# Patient Record
Sex: FEMALE | Race: WHITE | NOT HISPANIC OR LATINO | Employment: UNEMPLOYED | URBAN - NONMETROPOLITAN AREA
[De-identification: names, ages, dates, MRNs, and addresses within clinical notes are randomized per-mention and may not be internally consistent; named-entity substitution may affect disease eponyms.]

---

## 2024-08-26 ENCOUNTER — HOSPITAL ENCOUNTER (EMERGENCY)
Facility: HOSPITAL | Age: 59
Discharge: HOME | End: 2024-08-26
Attending: EMERGENCY MEDICINE
Payer: MEDICAID

## 2024-08-26 ENCOUNTER — APPOINTMENT (OUTPATIENT)
Dept: RADIOLOGY | Facility: HOSPITAL | Age: 59
End: 2024-08-26
Payer: MEDICAID

## 2024-08-26 VITALS
WEIGHT: 167.77 LBS | HEIGHT: 63 IN | OXYGEN SATURATION: 97 % | DIASTOLIC BLOOD PRESSURE: 86 MMHG | BODY MASS INDEX: 29.73 KG/M2 | SYSTOLIC BLOOD PRESSURE: 148 MMHG | HEART RATE: 105 BPM | TEMPERATURE: 98.3 F | RESPIRATION RATE: 17 BRPM

## 2024-08-26 DIAGNOSIS — S89.92XA INJURY OF LEFT KNEE, INITIAL ENCOUNTER: Primary | ICD-10-CM

## 2024-08-26 DIAGNOSIS — M25.562 ACUTE PAIN OF LEFT KNEE: ICD-10-CM

## 2024-08-26 DIAGNOSIS — M17.12 OSTEOARTHRITIS OF LEFT KNEE, UNSPECIFIED OSTEOARTHRITIS TYPE: ICD-10-CM

## 2024-08-26 DIAGNOSIS — M79.89 LEFT LEG SWELLING: ICD-10-CM

## 2024-08-26 PROCEDURE — 99284 EMERGENCY DEPT VISIT MOD MDM: CPT | Mod: 25

## 2024-08-26 PROCEDURE — 73564 X-RAY EXAM KNEE 4 OR MORE: CPT | Mod: LEFT SIDE | Performed by: RADIOLOGY

## 2024-08-26 PROCEDURE — 93971 EXTREMITY STUDY: CPT | Performed by: RADIOLOGY

## 2024-08-26 PROCEDURE — 73564 X-RAY EXAM KNEE 4 OR MORE: CPT | Mod: LT

## 2024-08-26 PROCEDURE — 93971 EXTREMITY STUDY: CPT

## 2024-08-26 RX ORDER — ESCITALOPRAM OXALATE 20 MG/1
20 TABLET ORAL DAILY
COMMUNITY

## 2024-08-26 RX ORDER — ESCITALOPRAM OXALATE 5 MG/5ML
5 SOLUTION ORAL
COMMUNITY

## 2024-08-26 RX ORDER — GABAPENTIN 300 MG/1
300 CAPSULE ORAL 2 TIMES DAILY
COMMUNITY

## 2024-08-26 RX ORDER — FLUTICASONE PROPIONATE 50 MCG
1 SPRAY, SUSPENSION (ML) NASAL
COMMUNITY
Start: 2017-08-31

## 2024-08-26 RX ORDER — FAMOTIDINE 40 MG/1
40 TABLET, FILM COATED ORAL
COMMUNITY
Start: 2023-02-24

## 2024-08-26 ASSESSMENT — PAIN SCALES - GENERAL
PAINLEVEL_OUTOF10: 4
PAINLEVEL_OUTOF10: 10 - WORST POSSIBLE PAIN

## 2024-08-26 ASSESSMENT — COLUMBIA-SUICIDE SEVERITY RATING SCALE - C-SSRS
6. HAVE YOU EVER DONE ANYTHING, STARTED TO DO ANYTHING, OR PREPARED TO DO ANYTHING TO END YOUR LIFE?: NO
2. HAVE YOU ACTUALLY HAD ANY THOUGHTS OF KILLING YOURSELF?: NO
1. IN THE PAST MONTH, HAVE YOU WISHED YOU WERE DEAD OR WISHED YOU COULD GO TO SLEEP AND NOT WAKE UP?: NO

## 2024-08-26 ASSESSMENT — PAIN - FUNCTIONAL ASSESSMENT
PAIN_FUNCTIONAL_ASSESSMENT: 0-10
PAIN_FUNCTIONAL_ASSESSMENT: 0-10

## 2024-08-26 ASSESSMENT — PAIN DESCRIPTION - ORIENTATION: ORIENTATION: LEFT

## 2024-08-26 ASSESSMENT — PAIN DESCRIPTION - PAIN TYPE: TYPE: ACUTE PAIN

## 2024-08-26 ASSESSMENT — PAIN DESCRIPTION - DESCRIPTORS: DESCRIPTORS: ACHING

## 2024-08-26 ASSESSMENT — PAIN DESCRIPTION - LOCATION: LOCATION: KNEE
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
## 2024-08-26 NOTE — ED TRIAGE NOTES
Pt arrived via EMS with left knee pain. Pt reports the pain results from getting out of a squatting position about 3 days ago. Pt reports the pain is at about an 8/10 at the time or triage.

## 2025-04-06 ENCOUNTER — APPOINTMENT (OUTPATIENT)
Dept: RADIOLOGY | Facility: HOSPITAL | Age: 60
End: 2025-04-06
Payer: MEDICAID

## 2025-04-06 ENCOUNTER — HOSPITAL ENCOUNTER (EMERGENCY)
Facility: HOSPITAL | Age: 60
Discharge: HOME | End: 2025-04-06
Attending: EMERGENCY MEDICINE
Payer: MEDICAID

## 2025-04-06 VITALS
DIASTOLIC BLOOD PRESSURE: 71 MMHG | HEART RATE: 78 BPM | SYSTOLIC BLOOD PRESSURE: 126 MMHG | RESPIRATION RATE: 20 BRPM | OXYGEN SATURATION: 94 % | HEIGHT: 63 IN | WEIGHT: 175 LBS | BODY MASS INDEX: 31.01 KG/M2 | TEMPERATURE: 97.5 F

## 2025-04-06 DIAGNOSIS — S91.301A NON-HEALING WOUND OF RIGHT HEEL: ICD-10-CM

## 2025-04-06 DIAGNOSIS — S91.302A NON HEALING LEFT HEEL WOUND: Primary | ICD-10-CM

## 2025-04-06 LAB
BASOPHILS # BLD AUTO: 0.07 X10*3/UL (ref 0–0.1)
BASOPHILS NFR BLD AUTO: 1 %
EOSINOPHIL # BLD AUTO: 0.26 X10*3/UL (ref 0–0.7)
EOSINOPHIL NFR BLD AUTO: 3.9 %
ERYTHROCYTE [DISTWIDTH] IN BLOOD BY AUTOMATED COUNT: 13.7 % (ref 11.5–14.5)
ERYTHROCYTE [SEDIMENTATION RATE] IN BLOOD BY WESTERGREN METHOD: 5 MM/H (ref 0–30)
HCT VFR BLD AUTO: 41.8 % (ref 36–46)
HGB BLD-MCNC: 13.1 G/DL (ref 12–16)
HOLD SPECIMEN: NORMAL
IMM GRANULOCYTES # BLD AUTO: 0.01 X10*3/UL (ref 0–0.7)
IMM GRANULOCYTES NFR BLD AUTO: 0.1 % (ref 0–0.9)
LYMPHOCYTES # BLD AUTO: 2.11 X10*3/UL (ref 1.2–4.8)
LYMPHOCYTES NFR BLD AUTO: 31.6 %
MCH RBC QN AUTO: 33.9 PG (ref 26–34)
MCHC RBC AUTO-ENTMCNC: 31.3 G/DL (ref 32–36)
MCV RBC AUTO: 108 FL (ref 80–100)
MONOCYTES # BLD AUTO: 0.6 X10*3/UL (ref 0.1–1)
MONOCYTES NFR BLD AUTO: 9 %
NEUTROPHILS # BLD AUTO: 3.63 X10*3/UL (ref 1.2–7.7)
NEUTROPHILS NFR BLD AUTO: 54.4 %
NRBC BLD-RTO: 0 /100 WBCS (ref 0–0)
PLATELET # BLD AUTO: 203 X10*3/UL (ref 150–450)
RBC # BLD AUTO: 3.87 X10*6/UL (ref 4–5.2)
WBC # BLD AUTO: 6.7 X10*3/UL (ref 4.4–11.3)

## 2025-04-06 PROCEDURE — 73700 CT LOWER EXTREMITY W/O DYE: CPT | Mod: RT

## 2025-04-06 PROCEDURE — 85652 RBC SED RATE AUTOMATED: CPT | Performed by: EMERGENCY MEDICINE

## 2025-04-06 PROCEDURE — 36415 COLL VENOUS BLD VENIPUNCTURE: CPT | Performed by: EMERGENCY MEDICINE

## 2025-04-06 PROCEDURE — 85025 COMPLETE CBC W/AUTO DIFF WBC: CPT | Performed by: EMERGENCY MEDICINE

## 2025-04-06 PROCEDURE — 73700 CT LOWER EXTREMITY W/O DYE: CPT | Mod: RIGHT SIDE | Performed by: STUDENT IN AN ORGANIZED HEALTH CARE EDUCATION/TRAINING PROGRAM

## 2025-04-06 PROCEDURE — 2500000001 HC RX 250 WO HCPCS SELF ADMINISTERED DRUGS (ALT 637 FOR MEDICARE OP): Mod: SE | Performed by: EMERGENCY MEDICINE

## 2025-04-06 PROCEDURE — 99284 EMERGENCY DEPT VISIT MOD MDM: CPT | Mod: 25 | Performed by: EMERGENCY MEDICINE

## 2025-04-06 RX ORDER — TRAMADOL HYDROCHLORIDE 50 MG/1
50 TABLET ORAL EVERY 6 HOURS PRN
Status: DISCONTINUED | OUTPATIENT
Start: 2025-04-06 | End: 2025-04-06

## 2025-04-06 RX ORDER — IBUPROFEN 600 MG/1
600 TABLET ORAL ONCE
Status: COMPLETED | OUTPATIENT
Start: 2025-04-06 | End: 2025-04-06

## 2025-04-06 RX ADMIN — TRAMADOL HYDROCHLORIDE 50 MG: 50 TABLET, COATED ORAL at 16:41

## 2025-04-06 RX ADMIN — IBUPROFEN 600 MG: 600 TABLET ORAL at 16:54

## 2025-04-06 ASSESSMENT — PAIN DESCRIPTION - LOCATION
LOCATION: FOOT
LOCATION: FOOT

## 2025-04-06 ASSESSMENT — COLUMBIA-SUICIDE SEVERITY RATING SCALE - C-SSRS
2. HAVE YOU ACTUALLY HAD ANY THOUGHTS OF KILLING YOURSELF?: NO
6. HAVE YOU EVER DONE ANYTHING, STARTED TO DO ANYTHING, OR PREPARED TO DO ANYTHING TO END YOUR LIFE?: NO
1. IN THE PAST MONTH, HAVE YOU WISHED YOU WERE DEAD OR WISHED YOU COULD GO TO SLEEP AND NOT WAKE UP?: NO

## 2025-04-06 ASSESSMENT — PAIN DESCRIPTION - ORIENTATION
ORIENTATION: RIGHT
ORIENTATION: RIGHT

## 2025-04-06 ASSESSMENT — PAIN DESCRIPTION - PAIN TYPE
TYPE: ACUTE PAIN
TYPE: ACUTE PAIN

## 2025-04-06 ASSESSMENT — PAIN DESCRIPTION - PROGRESSION
CLINICAL_PROGRESSION: GRADUALLY WORSENING
CLINICAL_PROGRESSION: GRADUALLY IMPROVING
CLINICAL_PROGRESSION: GRADUALLY IMPROVING

## 2025-04-06 ASSESSMENT — PAIN SCALES - GENERAL
PAINLEVEL_OUTOF10: 7
PAINLEVEL_OUTOF10: 10 - WORST POSSIBLE PAIN
PAINLEVEL_OUTOF10: 10 - WORST POSSIBLE PAIN
PAINLEVEL_OUTOF10: 3

## 2025-04-06 ASSESSMENT — PAIN DESCRIPTION - DESCRIPTORS
DESCRIPTORS: SHARP
DESCRIPTORS: STABBING;SHARP

## 2025-04-06 ASSESSMENT — PAIN - FUNCTIONAL ASSESSMENT
PAIN_FUNCTIONAL_ASSESSMENT: 0-10

## 2025-04-06 NOTE — DISCHARGE INSTRUCTIONS
Change dressing daily after carefully cleaning the area and patting it dry.  Apply new silver dressing and wrap.    Follow-up with your wound care team for further help in healing this ulceration.      Return for worsening symptoms or concerns.

## 2025-04-06 NOTE — ED PROVIDER NOTES
[facility]   ED  Provider Note  4/6/2025  4:05 PM  AC11/AC11      Chief Complaint   Patient presents with    Wound Check     Open wound to right foot        History of Present Illness:   Gloria Bhagat is a 59 y.o. female presenting to the ED for right heel pain and wound, beginning several weeks ago.  The complaint has been persistent, moderate in severity, and worsened by weightbearing.  Patient has a wound on her right heel is been present for several weeks.  Is actually improving with silver dressing treatments.  She ran out of her silver sheets today and is in the ER to get more supplies.  The pain is unchanged from her baseline.  She did hurt severely stress when she walks on it.  She is followed in pain management to control this pain.  She denies fever chills lymphangitis or signs systemic illness.      Review of Systems:   Pertinent positives and review of systems as noted above.  Remaining 10 review of systems is negative or noncontributory to today's episode of care.  Review of Systems       --------------------------------------------- PAST HISTORY ---------------------------------------------  Past Medical History:   Past Medical History:   Diagnosis Date    Emphysema lung (Multi)         Past Surgical History:   Past Surgical History:   Procedure Laterality Date    COLONOSCOPY  10/26/2017    Complete Colonoscopy        Social History:   Social History     Social History Narrative    Not on file        Family History: family history is not on file. Unless otherwise noted, family history is non contributory    Patient's Medications   New Prescriptions    No medications on file   Previous Medications    ESCITALOPRAM (LEXAPRO) 20 MG TABLET    Take 1 tablet (20 mg) by mouth once daily.    ESCITALOPRAM (LEXAPRO) 5 MG/5 ML SOLUTION    Take 5 mL (5 mg) by mouth.    FAMOTIDINE (PEPCID) 40 MG TABLET    Take 1 tablet (40 mg) by mouth.    FLUTICASONE (FLONASE) 50 MCG/ACTUATION NASAL SPRAY    1 spray.     "GABAPENTIN (NEURONTIN) 300 MG CAPSULE    Take 1 capsule (300 mg) by mouth 2 times a day.   Modified Medications    No medications on file   Discontinued Medications    No medications on file      The patient’s home medications have been reviewed.    Allergies: Penicillin    -------------------------------------------------- RESULTS -------------------------------------------------  All laboratory and radiology results have been personally reviewed by myself   LABS:  Labs Reviewed   CBC WITH AUTO DIFFERENTIAL - Abnormal       Result Value    WBC 6.7      nRBC 0.0      RBC 3.87 (*)     Hemoglobin 13.1      Hematocrit 41.8       (*)     MCH 33.9      MCHC 31.3 (*)     RDW 13.7      Platelets 203      Neutrophils % 54.4      Immature Granulocytes %, Automated 0.1      Lymphocytes % 31.6      Monocytes % 9.0      Eosinophils % 3.9      Basophils % 1.0      Neutrophils Absolute 3.63      Immature Granulocytes Absolute, Automated 0.01      Lymphocytes Absolute 2.11      Monocytes Absolute 0.60      Eosinophils Absolute 0.26      Basophils Absolute 0.07     SEDIMENTATION RATE, AUTOMATED - Normal    Sedimentation Rate 5     GRAY TOP         RADIOLOGY:  Interpreted by Radiologist.  CT foot right wo IV contrast   Final Result   Diffuse subcutaneous soft tissue edema may reflect cellulitis, venous   stasis, lymphedema, or fluid overload.        No osseous erosive change to suggest osteomyelitis. If there is   persistent clinical concern, consider MRI.        MACRO:   None        Signed by: Cornel Batista 4/6/2025 5:46 PM   Dictation workstation:   [id number]          No results found for this or any previous visit (from the past 4464 hours).  ------------------------- NURSING NOTES AND VITALS REVIEWED ---------------------------   The nursing notes within the ED encounter and vital signs as below have been reviewed.   /78   Pulse 87   Temp 36.5 °C (97.7 °F) (Temporal)   Resp 18   Ht 1.6 m (5' 3\")   Wt 79.4 kg " (175 lb)   SpO2 93%   BMI 31.00 kg/m²   Oxygen Saturation Interpretation: Normal      ---------------------------------------------------PHYSICAL EXAM--------------------------------------  Physical Exam   Constitutional/General: Alert,  well appearing, non toxic in NAD  Head: Normocephalic and atraumatic  Eyes: PERRL, EOMI, conjunctiva normal, sclera non icteric  Mouth: Oropharynx clear, handling secretions, no trismus, no asymmetry of the posterior oropharynx or uvular edema  Neck: Supple, full ROM, non tender to palpation in the midline, no stridor, no crepitus, no meningeal signs  Respiratory: Lungs clear to auscultation bilaterally, no wheezes, rales, or rhonchi. Not in respiratory distress  Cardiovascular:  Regular rate. Regular rhythm. No murmurs, gallops, or rubs. 2+ distal pulses  Chest: No chest wall tenderness  GI:  Abdomen Soft, Non tender, Non distended.  +BS. No organomegaly, no palpable masses,  No rebound, guarding, or rigidity.   Musculoskeletal: Moves all extremities x 4. Warm and well perfused, no clubbing, cyanosis, or edema. Capillary refill <3 seconds  Integument: skin warm and dry. No rashes.   Lymphatic: no lymphadenopathy noted  Neurologic: No focal deficits, symmetric strength 5/5 in the upper and lower extremities bilaterally  Psychiatric: Normal Affect    Procedures    ------------------------------ ED COURSE/MEDICAL DECISION MAKING----------------------  Diagnoses as of 04/09/25 0837   Non healing left heel wound - cancel this diagnosis   Non-healing wound of right heel      Patient has a wound on her left heel with no evidence of localized infection on clinical exam.  She has some moderate swelling.  She did use Lidoderm patches for several weeks to control the pain which caused an allergic reaction.  CT scan is negative for evidence of osteomyelitis.  The patient's wound was dressed and she discharged home for outpatient management.      Medical Decision Making:   Discharge to  home  Diagnoses as of 04/09/25 0837   Non healing left heel wound - cancel this diagnosis   Non-healing wound of right heel      Counseling:   The emergency provider has spoken with the patient and discussed today’s results, in addition to providing specific details for the plan of care and counseling regarding the diagnosis and prognosis.  Questions are answered at this time and they are agreeable with the plan.      --------------------------------- IMPRESSION AND DISPOSITION ---------------------------------        IMPRESSION  1. Non healing left heel wound        DISPOSITION  Disposition: Discharge to home  Patient condition is fair      Billing Provider Critical Care Time: 0 minutes     Saad Hernandez MD  04/06/25 1756       Saad Hernandez MD  04/09/25 0847

## 2025-04-18 ENCOUNTER — APPOINTMENT (OUTPATIENT)
Dept: RADIOLOGY | Facility: HOSPITAL | Age: 60
End: 2025-04-18
Payer: MEDICAID

## 2025-04-18 ENCOUNTER — HOSPITAL ENCOUNTER (EMERGENCY)
Facility: HOSPITAL | Age: 60
Discharge: ANOTHER HOSPITAL | End: 2025-04-19
Attending: EMERGENCY MEDICINE
Payer: MEDICAID

## 2025-04-18 ENCOUNTER — APPOINTMENT (OUTPATIENT)
Dept: CARDIOLOGY | Facility: HOSPITAL | Age: 60
End: 2025-04-18
Payer: MEDICAID

## 2025-04-18 DIAGNOSIS — R91.1 NODULE OF UPPER LOBE OF LEFT LUNG: ICD-10-CM

## 2025-04-18 DIAGNOSIS — J44.1 COPD EXACERBATION (MULTI): ICD-10-CM

## 2025-04-18 DIAGNOSIS — L97.519: Primary | ICD-10-CM

## 2025-04-18 DIAGNOSIS — J18.9 PNEUMONIA OF BOTH LOWER LOBES DUE TO INFECTIOUS ORGANISM: ICD-10-CM

## 2025-04-18 DIAGNOSIS — J43.9 PULMONARY EMPHYSEMA, UNSPECIFIED EMPHYSEMA TYPE (MULTI): ICD-10-CM

## 2025-04-18 LAB
ALBUMIN SERPL BCP-MCNC: 4.1 G/DL (ref 3.4–5)
ALP SERPL-CCNC: 51 U/L (ref 33–110)
ALT SERPL W P-5'-P-CCNC: 12 U/L (ref 7–45)
ANION GAP BLDA CALCULATED.4IONS-SCNC: 5 MMO/L (ref 10–25)
ANION GAP SERPL CALC-SCNC: 11 MMOL/L (ref 10–20)
ARTERIAL PATENCY WRIST A: POSITIVE
AST SERPL W P-5'-P-CCNC: 16 U/L (ref 9–39)
BASE EXCESS BLDA CALC-SCNC: 3.5 MMOL/L (ref -2–3)
BASOPHILS # BLD AUTO: 0.05 X10*3/UL (ref 0–0.1)
BASOPHILS NFR BLD AUTO: 0.6 %
BILIRUB SERPL-MCNC: 0.4 MG/DL (ref 0–1.2)
BNP SERPL-MCNC: 20 PG/ML (ref 0–99)
BODY TEMPERATURE: ABNORMAL
BUN SERPL-MCNC: 19 MG/DL (ref 6–23)
CA-I BLDA-SCNC: 1.2 MMOL/L (ref 1.1–1.33)
CALCIUM SERPL-MCNC: 9.1 MG/DL (ref 8.6–10.3)
CARDIAC TROPONIN I PNL SERPL HS: 5 NG/L (ref 0–13)
CARDIAC TROPONIN I PNL SERPL HS: 5 NG/L (ref 0–13)
CHLORIDE BLDA-SCNC: 108 MMOL/L (ref 98–107)
CHLORIDE SERPL-SCNC: 104 MMOL/L (ref 98–107)
CO2 SERPL-SCNC: 28 MMOL/L (ref 21–32)
CREAT SERPL-MCNC: 0.9 MG/DL (ref 0.5–1.05)
D DIMER PPP FEU-MCNC: 863 NG/ML FEU
EGFRCR SERPLBLD CKD-EPI 2021: 74 ML/MIN/1.73M*2
EOSINOPHIL # BLD AUTO: 0.26 X10*3/UL (ref 0–0.7)
EOSINOPHIL NFR BLD AUTO: 3.1 %
ERYTHROCYTE [DISTWIDTH] IN BLOOD BY AUTOMATED COUNT: 13.3 % (ref 11.5–14.5)
GLUCOSE BLDA-MCNC: 120 MG/DL (ref 74–99)
GLUCOSE SERPL-MCNC: 113 MG/DL (ref 74–99)
HCO3 BLDA-SCNC: 27.8 MMOL/L (ref 22–26)
HCT VFR BLD AUTO: 38.6 % (ref 36–46)
HCT VFR BLD EST: 39 % (ref 36–46)
HGB BLD-MCNC: 12.8 G/DL (ref 12–16)
HGB BLDA-MCNC: 13.1 G/DL (ref 12–16)
HOLD SPECIMEN: NORMAL
HOLD SPECIMEN: NORMAL
IMM GRANULOCYTES # BLD AUTO: 0.02 X10*3/UL (ref 0–0.7)
IMM GRANULOCYTES NFR BLD AUTO: 0.2 % (ref 0–0.9)
INHALED O2 CONCENTRATION: 21 %
LACTATE BLDA-SCNC: 0.5 MMOL/L (ref 0.4–2)
LYMPHOCYTES # BLD AUTO: 1.76 X10*3/UL (ref 1.2–4.8)
LYMPHOCYTES NFR BLD AUTO: 21.2 %
MCH RBC QN AUTO: 33.1 PG (ref 26–34)
MCHC RBC AUTO-ENTMCNC: 33.2 G/DL (ref 32–36)
MCV RBC AUTO: 100 FL (ref 80–100)
MONOCYTES # BLD AUTO: 0.81 X10*3/UL (ref 0.1–1)
MONOCYTES NFR BLD AUTO: 9.7 %
NEUTROPHILS # BLD AUTO: 5.41 X10*3/UL (ref 1.2–7.7)
NEUTROPHILS NFR BLD AUTO: 65.2 %
NRBC BLD-RTO: 0 /100 WBCS (ref 0–0)
OXYHGB MFR BLDA: 80.9 % (ref 94–98)
PCO2 BLDA: 40 MM HG (ref 38–42)
PH BLDA: 7.45 PH (ref 7.38–7.42)
PLATELET # BLD AUTO: 235 X10*3/UL (ref 150–450)
PO2 BLDA: 53 MM HG (ref 85–95)
POTASSIUM BLDA-SCNC: 3.9 MMOL/L (ref 3.5–5.3)
POTASSIUM SERPL-SCNC: 3.6 MMOL/L (ref 3.5–5.3)
PROT SERPL-MCNC: 6.6 G/DL (ref 6.4–8.2)
RBC # BLD AUTO: 3.87 X10*6/UL (ref 4–5.2)
SAO2 % BLDA: 90 % (ref 94–100)
SODIUM BLDA-SCNC: 137 MMOL/L (ref 136–145)
SODIUM SERPL-SCNC: 139 MMOL/L (ref 136–145)
SPECIMEN DRAWN FROM PATIENT: ABNORMAL
WBC # BLD AUTO: 8.3 X10*3/UL (ref 4.4–11.3)

## 2025-04-18 PROCEDURE — 84132 ASSAY OF SERUM POTASSIUM: CPT | Performed by: EMERGENCY MEDICINE

## 2025-04-18 PROCEDURE — 71275 CT ANGIOGRAPHY CHEST: CPT

## 2025-04-18 PROCEDURE — 36415 COLL VENOUS BLD VENIPUNCTURE: CPT | Performed by: EMERGENCY MEDICINE

## 2025-04-18 PROCEDURE — 96365 THER/PROPH/DIAG IV INF INIT: CPT

## 2025-04-18 PROCEDURE — 2500000002 HC RX 250 W HCPCS SELF ADMINISTERED DRUGS (ALT 637 FOR MEDICARE OP, ALT 636 FOR OP/ED): Mod: JZ,SE | Performed by: EMERGENCY MEDICINE

## 2025-04-18 PROCEDURE — 93005 ELECTROCARDIOGRAM TRACING: CPT

## 2025-04-18 PROCEDURE — 96366 THER/PROPH/DIAG IV INF ADDON: CPT

## 2025-04-18 PROCEDURE — 94640 AIRWAY INHALATION TREATMENT: CPT

## 2025-04-18 PROCEDURE — 96367 TX/PROPH/DG ADDL SEQ IV INF: CPT

## 2025-04-18 PROCEDURE — 85025 COMPLETE CBC W/AUTO DIFF WBC: CPT | Performed by: EMERGENCY MEDICINE

## 2025-04-18 PROCEDURE — 81003 URINALYSIS AUTO W/O SCOPE: CPT | Performed by: EMERGENCY MEDICINE

## 2025-04-18 PROCEDURE — 2500000004 HC RX 250 GENERAL PHARMACY W/ HCPCS (ALT 636 FOR OP/ED): Mod: JZ,SE | Performed by: EMERGENCY MEDICINE

## 2025-04-18 PROCEDURE — 73700 CT LOWER EXTREMITY W/O DYE: CPT | Mod: RIGHT SIDE | Performed by: STUDENT IN AN ORGANIZED HEALTH CARE EDUCATION/TRAINING PROGRAM

## 2025-04-18 PROCEDURE — 85379 FIBRIN DEGRADATION QUANT: CPT | Performed by: EMERGENCY MEDICINE

## 2025-04-18 PROCEDURE — 87075 CULTR BACTERIA EXCEPT BLOOD: CPT | Mod: CONLAB | Performed by: EMERGENCY MEDICINE

## 2025-04-18 PROCEDURE — 71045 X-RAY EXAM CHEST 1 VIEW: CPT | Performed by: STUDENT IN AN ORGANIZED HEALTH CARE EDUCATION/TRAINING PROGRAM

## 2025-04-18 PROCEDURE — 99285 EMERGENCY DEPT VISIT HI MDM: CPT | Mod: 25 | Performed by: EMERGENCY MEDICINE

## 2025-04-18 PROCEDURE — 73700 CT LOWER EXTREMITY W/O DYE: CPT | Mod: RT

## 2025-04-18 PROCEDURE — 87040 BLOOD CULTURE FOR BACTERIA: CPT | Mod: CONLAB | Performed by: EMERGENCY MEDICINE

## 2025-04-18 PROCEDURE — 2550000001 HC RX 255 CONTRASTS: Mod: JZ,SE | Performed by: EMERGENCY MEDICINE

## 2025-04-18 PROCEDURE — 84484 ASSAY OF TROPONIN QUANT: CPT | Performed by: EMERGENCY MEDICINE

## 2025-04-18 PROCEDURE — 2500000005 HC RX 250 GENERAL PHARMACY W/O HCPCS: Mod: SE | Performed by: EMERGENCY MEDICINE

## 2025-04-18 PROCEDURE — 71275 CT ANGIOGRAPHY CHEST: CPT | Performed by: RADIOLOGY

## 2025-04-18 PROCEDURE — 71045 X-RAY EXAM CHEST 1 VIEW: CPT

## 2025-04-18 PROCEDURE — 83880 ASSAY OF NATRIURETIC PEPTIDE: CPT | Performed by: EMERGENCY MEDICINE

## 2025-04-18 RX ORDER — CEFTRIAXONE 1 G/50ML
1 INJECTION, SOLUTION INTRAVENOUS ONCE
Status: COMPLETED | OUTPATIENT
Start: 2025-04-18 | End: 2025-04-19

## 2025-04-18 RX ORDER — AZITHROMYCIN MONOHYDRATE 500 MG/5ML
INJECTION, POWDER, LYOPHILIZED, FOR SOLUTION INTRAVENOUS
Status: COMPLETED
Start: 2025-04-18 | End: 2025-04-18

## 2025-04-18 RX ORDER — IPRATROPIUM BROMIDE AND ALBUTEROL SULFATE 2.5; .5 MG/3ML; MG/3ML
3 SOLUTION RESPIRATORY (INHALATION)
Status: DISCONTINUED | OUTPATIENT
Start: 2025-04-18 | End: 2025-04-19

## 2025-04-18 RX ADMIN — Medication 2 L/MIN: at 19:59

## 2025-04-18 RX ADMIN — IPRATROPIUM BROMIDE AND ALBUTEROL SULFATE 3 ML: .5; 3 SOLUTION RESPIRATORY (INHALATION) at 19:35

## 2025-04-18 RX ADMIN — IOHEXOL 50 ML: 350 INJECTION, SOLUTION INTRAVENOUS at 21:38

## 2025-04-18 RX ADMIN — VANCOMYCIN HYDROCHLORIDE 1500 MG: 1.5 INJECTION, POWDER, LYOPHILIZED, FOR SOLUTION INTRAVENOUS at 19:41

## 2025-04-18 RX ADMIN — AZITHROMYCIN 500 MG: 500 INJECTION, POWDER, LYOPHILIZED, FOR SOLUTION INTRAVENOUS at 22:59

## 2025-04-18 RX ADMIN — CEFTRIAXONE 1 G: 1 INJECTION, SOLUTION INTRAVENOUS at 22:59

## 2025-04-18 RX ADMIN — Medication 2 L/MIN: at 19:54

## 2025-04-18 ASSESSMENT — COLUMBIA-SUICIDE SEVERITY RATING SCALE - C-SSRS
6. HAVE YOU EVER DONE ANYTHING, STARTED TO DO ANYTHING, OR PREPARED TO DO ANYTHING TO END YOUR LIFE?: NO
1. IN THE PAST MONTH, HAVE YOU WISHED YOU WERE DEAD OR WISHED YOU COULD GO TO SLEEP AND NOT WAKE UP?: NO
2. HAVE YOU ACTUALLY HAD ANY THOUGHTS OF KILLING YOURSELF?: NO

## 2025-04-18 ASSESSMENT — PAIN DESCRIPTION - PROGRESSION: CLINICAL_PROGRESSION: GRADUALLY WORSENING

## 2025-04-18 ASSESSMENT — PAIN - FUNCTIONAL ASSESSMENT: PAIN_FUNCTIONAL_ASSESSMENT: 0-10

## 2025-04-18 ASSESSMENT — PAIN DESCRIPTION - FREQUENCY: FREQUENCY: CONSTANT/CONTINUOUS

## 2025-04-18 ASSESSMENT — PAIN DESCRIPTION - LOCATION: LOCATION: FOOT

## 2025-04-18 ASSESSMENT — PAIN DESCRIPTION - PAIN TYPE: TYPE: ACUTE PAIN

## 2025-04-18 ASSESSMENT — PAIN DESCRIPTION - DESCRIPTORS: DESCRIPTORS: PRESSURE

## 2025-04-18 ASSESSMENT — PAIN DESCRIPTION - ORIENTATION: ORIENTATION: RIGHT

## 2025-04-18 ASSESSMENT — PAIN SCALES - GENERAL: PAINLEVEL_OUTOF10: 9

## 2025-04-18 NOTE — ED PROVIDER NOTES
[facility]   ED  Provider Note  4/18/2025  6:20 PM  AC02/AC02      Chief Complaint   Patient presents with    Foot Pain        History of Present Illness:   Gloria Bhagat is a 59 y.o. female presenting to the ED for right foot ulcer, beginning more than a month ago.  The complaint has been persistent, moderate in severity, and worsened by nothing.  Patient was seen about a month ago in the ER for the same problem.  She has a follow-up with a podiatrist in a week or 2.  She has run out of antibiotics and feels that the wound is getting worse.  She has detected foul odor is increasing pain and tenderness of the calcaneus.  She denies signs systemic fever or chills.  She has bilateral lower extremity edema which is chronic for her.  She denies shortness of breath fever chills nausea vomiting or diarrhea.      Review of Systems:   Pertinent positives and review of systems as noted above.  Remaining 10 review of systems is negative or noncontributory to today's episode of care.  Review of Systems       --------------------------------------------- PAST HISTORY ---------------------------------------------  Past Medical History: Medical History[1]     Past Surgical History: Surgical History[2]     Social History:   Social History     Social History Narrative    Not on file        Family History: family history is not on file. Unless otherwise noted, family history is non contributory    Patient's Medications   New Prescriptions    No medications on file   Previous Medications    ESCITALOPRAM (LEXAPRO) 20 MG TABLET    Take 1 tablet (20 mg) by mouth once daily.    ESCITALOPRAM (LEXAPRO) 5 MG/5 ML SOLUTION    Take 5 mL (5 mg) by mouth.    FAMOTIDINE (PEPCID) 40 MG TABLET    Take 1 tablet (40 mg) by mouth.    FLUTICASONE (FLONASE) 50 MCG/ACTUATION NASAL SPRAY    1 spray.    GABAPENTIN (NEURONTIN) 300 MG CAPSULE    Take 1 capsule (300 mg) by mouth 2 times a day.   Modified Medications    No medications on file    Discontinued Medications    No medications on file      The patient’s home medications have been reviewed.    Allergies: Penicillin    -------------------------------------------------- RESULTS -------------------------------------------------  All laboratory and radiology results have been personally reviewed by myself   LABS:  Labs Reviewed   CBC WITH AUTO DIFFERENTIAL - Abnormal       Result Value    WBC 8.3      nRBC 0.0      RBC 3.87 (*)     Hemoglobin 12.8      Hematocrit 38.6            MCH 33.1      MCHC 33.2      RDW 13.3      Platelets 235      Neutrophils % 65.2      Immature Granulocytes %, Automated 0.2      Lymphocytes % 21.2      Monocytes % 9.7      Eosinophils % 3.1      Basophils % 0.6      Neutrophils Absolute 5.41      Immature Granulocytes Absolute, Automated 0.02      Lymphocytes Absolute 1.76      Monocytes Absolute 0.81      Eosinophils Absolute 0.26      Basophils Absolute 0.05     TISSUE/WOUND CULTURE/SMEAR   BLOOD CULTURE   BLOOD CULTURE   COMPREHENSIVE METABOLIC PANEL   BLOOD GAS ARTERIAL FULL PANEL   B-TYPE NATRIURETIC PEPTIDE   TROPONIN SERIES- (INITIAL, 1 HR)    Narrative:     The following orders were created for panel order Troponin Series, (0, 1 HR).  Procedure                               Abnormality         Status                     ---------                               -----------         ------                     Troponin I, High Sensiti...[[id number]]                      In process                 Troponin, High Sensitivi...[[id number]]                                                   Please view results for these tests on the individual orders.   D-DIMER, VTE EXCLUSION   SERIAL TROPONIN-INITIAL   SERIAL TROPONIN, 1 HOUR   GRAY TOP         RADIOLOGY:  Interpreted by Radiologist.  XR chest 1 view   Final Result   New nodular opacity at the left lung apex measuring 3.3 cm x 1.6 cm.   It demonstrates a somewhat elongated morphology. Therefore   differential diagnosis  "includes malignancy, atelectasis and   pneumonia. Unless clinical picture is consistent with pneumonia,   given emphysema and increased risk factors for lung cancer, recommend   CT of the chest for further evaluation, the urgency of which may be   determined on a clinical basis.        MACRO:   None.        Signed by: Deejay Domingo 4/18/2025 7:34 PM   Dictation workstation:   [id number]      CT foot right wo IV contrast   Final Result   1. Plantar soft tissue ulceration at the heel pad associated with   probable 1 cm x 0.9 cm abscess and surrounding induration and edema.        2. No osseous destruction or erosive changes of the calcaneus to   suggest osteomyelitis. MRI is more sensitive for detecting early   changes of osteomyelitis and may be of further diagnostic value if   clinical suspicion remains high.        3. Diffuse edema of the soft tissues without evidence of gas, which   is similar in degree to 04/06/2025. Correlate for cellulitis versus   venous stasis edema or combination thereof.             MACRO:   None.        Signed by: Deejay Domingo 4/18/2025 7:38 PM   Dictation workstation:   [id number]          No results found for this or any previous visit (from the past 4464 hours).  ------------------------- NURSING NOTES AND VITALS REVIEWED ---------------------------   The nursing notes within the ED encounter and vital signs as below have been reviewed.   /82 (BP Location: Right arm, Patient Position: Sitting)   Pulse 90   Temp 36.8 °C (98.2 °F) (Temporal)   Resp 18   Ht 1.6 m (5' 3\")   Wt 86.8 kg (191 lb 6.4 oz)   SpO2 90%   BMI 33.90 kg/m²   Oxygen Saturation Interpretation: Normal      ---------------------------------------------------PHYSICAL EXAM--------------------------------------  Physical Exam   Constitutional/General: Alert,  well appearing, non toxic in NAD  Head: Normocephalic and atraumatic  Eyes: PERRL, EOMI, conjunctiva normal, sclera non icteric  Mouth: " Oropharynx clear, handling secretions, no trismus, no asymmetry of the posterior oropharynx or uvular edema  Neck: Supple, full ROM, non tender to palpation in the midline, no stridor, no crepitus, no meningeal signs  Respiratory: Lungs elaina coarse breath sounds bilaterally, no wheezes, rales, or rhonchi. Not in respiratory distress  Cardiovascular:  Regular rate. Regular rhythm. No murmurs, gallops, or rubs. 2+ distal pulses  Chest: No chest wall tenderness  GI:  Abdomen Soft, Non tender, Non distended.  +BS. No organomegaly, no palpable masses,  No rebound, guarding, or rigidity.   Musculoskeletal: Moves all extremities x 4. Warm and well perfused, no clubbing, cyanosis, or edema. Capillary refill <3 seconds  Integument: skin warm and dry. No rashes.  1.5 cm ulceration in the heel of the right foot.  Minimal surrounding erythema.  No purulent drainage.  Lymphatic: no lymphadenopathy noted  Neurologic: No focal deficits, symmetric strength 5/5 in the upper and lower extremities bilaterally  Psychiatric: Normal Affect    Procedures    ------------------------------ ED COURSE/MEDICAL DECISION MAKING----------------------  Diagnoses as of 04/19/25 0750   Right foot ulcer, with unspecified severity (Multi)   Pulmonary emphysema, unspecified emphysema type (Multi)   Nodule of upper lobe of left lung   COPD exacerbation (Multi)   Pneumonia of both lower lobes due to infectious organism      Patient is a smoker.  She has a chronic cough and her pulse ox is 89 to 91% on room air.  She has a wound on the bottom of the right foot which has been present for more than a month.  She is yet to receive podiatry care.  She was seen in the ER and placed on antibiotics about a month ago for the same problem.  Chest x-ray reveals a 1.6 x 3.3 cm nodularity possible mass versus infectious cause in the left lung apex.      Medical Decision Making:   Patient was signed to Dr. Ferrer pending results.  Diagnoses as of 04/19/25 0750   Right  foot ulcer, with unspecified severity (Multi)   Pulmonary emphysema, unspecified emphysema type (Multi)   Nodule of upper lobe of left lung   COPD exacerbation (Multi)   Pneumonia of both lower lobes due to infectious organism      Counseling:   The emergency provider has spoken with the patient and discussed today’s results, in addition to providing specific details for the plan of care and counseling regarding the diagnosis and prognosis.  Questions are answered at this time and they are agreeable with the plan.      --------------------------------- IMPRESSION AND DISPOSITION ---------------------------------        IMPRESSION  1. Right foot ulcer, with unspecified severity (Multi)    2. Pulmonary emphysema, unspecified emphysema type (Multi)    3. Nodule of upper lobe of left lung        DISPOSITION  Disposition:  Signed out to oncoming physician  Patient condition is fair      Billing Provider Critical Care Time: 0 minutes         [1]   Past Medical History:  Diagnosis Date    Emphysema lung (Multi)    [2]   Past Surgical History:  Procedure Laterality Date    COLONOSCOPY  10/26/2017    Complete Colonoscopy        Saad Hernandez MD  04/19/25 5597

## 2025-04-19 ENCOUNTER — HOSPITAL ENCOUNTER (INPATIENT)
Facility: HOSPITAL | Age: 60
End: 2025-04-19
Attending: STUDENT IN AN ORGANIZED HEALTH CARE EDUCATION/TRAINING PROGRAM | Admitting: STUDENT IN AN ORGANIZED HEALTH CARE EDUCATION/TRAINING PROGRAM
Payer: MEDICAID

## 2025-04-19 VITALS
HEART RATE: 87 BPM | WEIGHT: 191.4 LBS | RESPIRATION RATE: 18 BRPM | SYSTOLIC BLOOD PRESSURE: 112 MMHG | BODY MASS INDEX: 33.91 KG/M2 | TEMPERATURE: 98 F | OXYGEN SATURATION: 93 % | HEIGHT: 63 IN | DIASTOLIC BLOOD PRESSURE: 53 MMHG

## 2025-04-19 DIAGNOSIS — J44.1 COPD EXACERBATION (MULTI): ICD-10-CM

## 2025-04-19 DIAGNOSIS — L97.519: Primary | ICD-10-CM

## 2025-04-19 PROBLEM — J18.9 PNEUMONIA OF BOTH LOWER LOBES DUE TO INFECTIOUS ORGANISM: Status: ACTIVE | Noted: 2025-04-19

## 2025-04-19 LAB
APPEARANCE UR: CLEAR
BILIRUB UR STRIP.AUTO-MCNC: NEGATIVE MG/DL
COLOR UR: NORMAL
GLUCOSE UR STRIP.AUTO-MCNC: NEGATIVE MG/DL
KETONES UR STRIP.AUTO-MCNC: NEGATIVE MG/DL
LEUKOCYTE ESTERASE UR QL STRIP.AUTO: NEGATIVE
MRSA DNA SPEC QL NAA+PROBE: NOT DETECTED
NITRITE UR QL STRIP.AUTO: NEGATIVE
PH UR STRIP.AUTO: 6 [PH]
PROT UR STRIP.AUTO-MCNC: NEGATIVE MG/DL
RBC # UR STRIP.AUTO: NEGATIVE MG/DL
SP GR UR STRIP.AUTO: 1.01
UROBILINOGEN UR STRIP.AUTO-MCNC: <2 MG/DL

## 2025-04-19 PROCEDURE — 9420000001 HC RT PATIENT EDUCATION 5 MIN

## 2025-04-19 PROCEDURE — 2500000005 HC RX 250 GENERAL PHARMACY W/O HCPCS: Mod: SE | Performed by: EMERGENCY MEDICINE

## 2025-04-19 PROCEDURE — 94640 AIRWAY INHALATION TREATMENT: CPT

## 2025-04-19 PROCEDURE — 2500000004 HC RX 250 GENERAL PHARMACY W/ HCPCS (ALT 636 FOR OP/ED): Performed by: PHARMACIST

## 2025-04-19 PROCEDURE — 87899 AGENT NOS ASSAY W/OPTIC: CPT | Mod: TRILAB | Performed by: NURSE PRACTITIONER

## 2025-04-19 PROCEDURE — 2500000004 HC RX 250 GENERAL PHARMACY W/ HCPCS (ALT 636 FOR OP/ED): Mod: JW | Performed by: NURSE PRACTITIONER

## 2025-04-19 PROCEDURE — 94668 MNPJ CHEST WALL SBSQ: CPT

## 2025-04-19 PROCEDURE — 97161 PT EVAL LOW COMPLEX 20 MIN: CPT | Mod: GP

## 2025-04-19 PROCEDURE — 2500000002 HC RX 250 W HCPCS SELF ADMINISTERED DRUGS (ALT 637 FOR MEDICARE OP, ALT 636 FOR OP/ED): Performed by: INTERNAL MEDICINE

## 2025-04-19 PROCEDURE — 87641 MR-STAPH DNA AMP PROBE: CPT | Performed by: NURSE PRACTITIONER

## 2025-04-19 PROCEDURE — S4991 NICOTINE PATCH NONLEGEND: HCPCS | Performed by: NURSE PRACTITIONER

## 2025-04-19 PROCEDURE — 99223 1ST HOSP IP/OBS HIGH 75: CPT | Performed by: NURSE PRACTITIONER

## 2025-04-19 PROCEDURE — 2500000004 HC RX 250 GENERAL PHARMACY W/ HCPCS (ALT 636 FOR OP/ED): Performed by: STUDENT IN AN ORGANIZED HEALTH CARE EDUCATION/TRAINING PROGRAM

## 2025-04-19 PROCEDURE — 2500000001 HC RX 250 WO HCPCS SELF ADMINISTERED DRUGS (ALT 637 FOR MEDICARE OP): Performed by: NURSE PRACTITIONER

## 2025-04-19 PROCEDURE — 94664 DEMO&/EVAL PT USE INHALER: CPT

## 2025-04-19 PROCEDURE — 2500000002 HC RX 250 W HCPCS SELF ADMINISTERED DRUGS (ALT 637 FOR MEDICARE OP, ALT 636 FOR OP/ED): Performed by: NURSE PRACTITIONER

## 2025-04-19 PROCEDURE — 97165 OT EVAL LOW COMPLEX 30 MIN: CPT | Mod: GO

## 2025-04-19 PROCEDURE — 2500000005 HC RX 250 GENERAL PHARMACY W/O HCPCS: Performed by: NURSE PRACTITIONER

## 2025-04-19 PROCEDURE — 99221 1ST HOSP IP/OBS SF/LOW 40: CPT | Performed by: STUDENT IN AN ORGANIZED HEALTH CARE EDUCATION/TRAINING PROGRAM

## 2025-04-19 PROCEDURE — 1100000001 HC PRIVATE ROOM DAILY

## 2025-04-19 PROCEDURE — 87449 NOS EACH ORGANISM AG IA: CPT | Mod: TRILAB | Performed by: NURSE PRACTITIONER

## 2025-04-19 RX ORDER — MAGNESIUM 200 MG
TABLET ORAL DAILY
COMMUNITY

## 2025-04-19 RX ORDER — FAMOTIDINE 20 MG/1
40 TABLET, FILM COATED ORAL DAILY
Status: DISCONTINUED | OUTPATIENT
Start: 2025-04-19 | End: 2025-04-21 | Stop reason: HOSPADM

## 2025-04-19 RX ORDER — FLUTICASONE PROPIONATE 50 MCG
1 SPRAY, SUSPENSION (ML) NASAL DAILY
Status: DISCONTINUED | OUTPATIENT
Start: 2025-04-19 | End: 2025-04-19

## 2025-04-19 RX ORDER — ERGOCALCIFEROL 1.25 MG/1
1 CAPSULE ORAL
COMMUNITY
Start: 2017-08-18

## 2025-04-19 RX ORDER — LIDOCAINE 560 MG/1
1 PATCH PERCUTANEOUS; TOPICAL; TRANSDERMAL DAILY
Status: DISCONTINUED | OUTPATIENT
Start: 2025-04-19 | End: 2025-04-21 | Stop reason: HOSPADM

## 2025-04-19 RX ORDER — DULOXETIN HYDROCHLORIDE 20 MG/1
60 CAPSULE, DELAYED RELEASE ORAL NIGHTLY
COMMUNITY
Start: 2025-03-14

## 2025-04-19 RX ORDER — VANCOMYCIN 1 G/200ML
1000 INJECTION, SOLUTION INTRAVENOUS EVERY 12 HOURS
Status: DISCONTINUED | OUTPATIENT
Start: 2025-04-19 | End: 2025-04-21

## 2025-04-19 RX ORDER — GUAIFENESIN 1200 MG/1
TABLET, EXTENDED RELEASE ORAL
COMMUNITY
Start: 2025-04-18

## 2025-04-19 RX ORDER — ALBUTEROL SULFATE 0.83 MG/ML
2.5 SOLUTION RESPIRATORY (INHALATION) EVERY 4 HOURS PRN
Status: DISCONTINUED | OUTPATIENT
Start: 2025-04-19 | End: 2025-04-21 | Stop reason: HOSPADM

## 2025-04-19 RX ORDER — IBUPROFEN 800 MG/1
1 TABLET ORAL 3 TIMES DAILY PRN
COMMUNITY
Start: 2017-08-15 | End: 2025-04-21 | Stop reason: HOSPADM

## 2025-04-19 RX ORDER — ONDANSETRON 4 MG/1
4 TABLET, ORALLY DISINTEGRATING ORAL EVERY 8 HOURS PRN
Status: DISCONTINUED | OUTPATIENT
Start: 2025-04-19 | End: 2025-04-21 | Stop reason: HOSPADM

## 2025-04-19 RX ORDER — BUPRENORPHINE AND NALOXONE 8; 2 MG/1; MG/1
1 FILM, SOLUBLE BUCCAL; SUBLINGUAL EVERY 12 HOURS
Status: DISCONTINUED | OUTPATIENT
Start: 2025-04-19 | End: 2025-04-21

## 2025-04-19 RX ORDER — LORATADINE 10 MG/1
10 TABLET ORAL
COMMUNITY
Start: 2017-08-25

## 2025-04-19 RX ORDER — MEROPENEM AND SODIUM CHLORIDE 1 G/50ML
1 INJECTION, SOLUTION INTRAVENOUS EVERY 8 HOURS
Status: DISCONTINUED | OUTPATIENT
Start: 2025-04-19 | End: 2025-04-21 | Stop reason: HOSPADM

## 2025-04-19 RX ORDER — ONDANSETRON HYDROCHLORIDE 2 MG/ML
4 INJECTION, SOLUTION INTRAVENOUS EVERY 8 HOURS PRN
Status: DISCONTINUED | OUTPATIENT
Start: 2025-04-19 | End: 2025-04-21 | Stop reason: HOSPADM

## 2025-04-19 RX ORDER — ALBUTEROL SULFATE 90 UG/1
2 INHALANT RESPIRATORY (INHALATION) EVERY 4 HOURS PRN
COMMUNITY
Start: 2017-02-03

## 2025-04-19 RX ORDER — IPRATROPIUM BROMIDE AND ALBUTEROL SULFATE 2.5; .5 MG/3ML; MG/3ML
3 SOLUTION RESPIRATORY (INHALATION)
Status: DISCONTINUED | OUTPATIENT
Start: 2025-04-19 | End: 2025-04-19

## 2025-04-19 RX ORDER — IPRATROPIUM BROMIDE AND ALBUTEROL SULFATE 2.5; .5 MG/3ML; MG/3ML
3 SOLUTION RESPIRATORY (INHALATION)
Status: DISCONTINUED | OUTPATIENT
Start: 2025-04-19 | End: 2025-04-21 | Stop reason: HOSPADM

## 2025-04-19 RX ORDER — FLUTICASONE PROPIONATE 50 MCG
2 SPRAY, SUSPENSION (ML) NASAL DAILY
Status: DISCONTINUED | OUTPATIENT
Start: 2025-04-20 | End: 2025-04-21 | Stop reason: HOSPADM

## 2025-04-19 RX ORDER — VANCOMYCIN HYDROCHLORIDE 1 G/20ML
INJECTION, POWDER, LYOPHILIZED, FOR SOLUTION INTRAVENOUS DAILY PRN
Status: DISCONTINUED | OUTPATIENT
Start: 2025-04-19 | End: 2025-04-21 | Stop reason: HOSPADM

## 2025-04-19 RX ORDER — IPRATROPIUM BROMIDE AND ALBUTEROL SULFATE 2.5; .5 MG/3ML; MG/3ML
3 SOLUTION RESPIRATORY (INHALATION) EVERY 2 HOUR PRN
Status: DISCONTINUED | OUTPATIENT
Start: 2025-04-19 | End: 2025-04-19 | Stop reason: HOSPADM

## 2025-04-19 RX ORDER — BUPRENORPHINE AND NALOXONE 8; 2 MG/1; MG/1
0.5 FILM, SOLUBLE BUCCAL; SUBLINGUAL EVERY 12 HOURS
COMMUNITY
Start: 2024-06-18 | End: 2025-05-08

## 2025-04-19 RX ORDER — VIT C/E/ZN/COPPR/LUTEIN/ZEAXAN 250MG-90MG
2500 CAPSULE ORAL
COMMUNITY
Start: 2025-03-03

## 2025-04-19 RX ORDER — ENOXAPARIN SODIUM 100 MG/ML
40 INJECTION SUBCUTANEOUS EVERY 24 HOURS
Status: DISCONTINUED | OUTPATIENT
Start: 2025-04-19 | End: 2025-04-21 | Stop reason: HOSPADM

## 2025-04-19 RX ORDER — IBUPROFEN 200 MG
1 TABLET ORAL DAILY
Status: DISCONTINUED | OUTPATIENT
Start: 2025-04-19 | End: 2025-04-21 | Stop reason: HOSPADM

## 2025-04-19 RX ORDER — DULOXETIN HYDROCHLORIDE 60 MG/1
60 CAPSULE, DELAYED RELEASE ORAL NIGHTLY
Status: DISCONTINUED | OUTPATIENT
Start: 2025-04-19 | End: 2025-04-21 | Stop reason: HOSPADM

## 2025-04-19 RX ORDER — BUSPIRONE HYDROCHLORIDE 15 MG/1
15 TABLET ORAL NIGHTLY
COMMUNITY
Start: 2025-03-20

## 2025-04-19 RX ORDER — ACETAMINOPHEN 325 MG/1
650 TABLET ORAL EVERY 4 HOURS PRN
Status: DISCONTINUED | OUTPATIENT
Start: 2025-04-19 | End: 2025-04-21 | Stop reason: HOSPADM

## 2025-04-19 RX ORDER — GABAPENTIN 300 MG/1
300 CAPSULE ORAL 2 TIMES DAILY
Status: DISCONTINUED | OUTPATIENT
Start: 2025-04-19 | End: 2025-04-21 | Stop reason: HOSPADM

## 2025-04-19 RX ORDER — LIDOCAINE 50 MG/G
1 PATCH TOPICAL
COMMUNITY
Start: 2020-11-16

## 2025-04-19 RX ORDER — PREDNISONE 20 MG/1
40 TABLET ORAL DAILY
Status: DISCONTINUED | OUTPATIENT
Start: 2025-04-19 | End: 2025-04-21 | Stop reason: HOSPADM

## 2025-04-19 RX ORDER — CETIRIZINE HYDROCHLORIDE 10 MG/1
10 TABLET ORAL DAILY
Status: DISCONTINUED | OUTPATIENT
Start: 2025-04-19 | End: 2025-04-21 | Stop reason: HOSPADM

## 2025-04-19 RX ORDER — KETOROLAC TROMETHAMINE 30 MG/ML
15 INJECTION, SOLUTION INTRAMUSCULAR; INTRAVENOUS EVERY 8 HOURS PRN
Status: DISPENSED | OUTPATIENT
Start: 2025-04-19 | End: 2025-04-21

## 2025-04-19 RX ADMIN — Medication 2 L/MIN: at 00:22

## 2025-04-19 RX ADMIN — IPRATROPIUM BROMIDE AND ALBUTEROL SULFATE 3 ML: 2.5; .5 SOLUTION RESPIRATORY (INHALATION) at 20:16

## 2025-04-19 RX ADMIN — NICOTINE 1 PATCH: 21 PATCH, EXTENDED RELEASE TRANSDERMAL at 10:17

## 2025-04-19 RX ADMIN — VANCOMYCIN 1000 MG: 1 INJECTION, SOLUTION INTRAVENOUS at 11:22

## 2025-04-19 RX ADMIN — FLUTICASONE PROPIONATE 1 SPRAY: 50 SPRAY, METERED NASAL at 10:17

## 2025-04-19 RX ADMIN — ENOXAPARIN SODIUM 40 MG: 40 INJECTION SUBCUTANEOUS at 10:17

## 2025-04-19 RX ADMIN — CETIRIZINE HYDROCHLORIDE 10 MG: 10 TABLET, FILM COATED ORAL at 10:17

## 2025-04-19 RX ADMIN — BUPRENORPHINE AND NALOXONE 1 FILM: 8; 2 FILM BUCCAL; SUBLINGUAL at 22:06

## 2025-04-19 RX ADMIN — KETOROLAC TROMETHAMINE 15 MG: 30 INJECTION, SOLUTION INTRAMUSCULAR at 10:30

## 2025-04-19 RX ADMIN — Medication 21 PERCENT: at 20:17

## 2025-04-19 RX ADMIN — PREDNISONE 40 MG: 20 TABLET ORAL at 17:57

## 2025-04-19 RX ADMIN — Medication 28 PERCENT: at 11:26

## 2025-04-19 RX ADMIN — FAMOTIDINE 40 MG: 20 TABLET, FILM COATED ORAL at 10:17

## 2025-04-19 RX ADMIN — MEROPENEM AND SODIUM CHLORIDE 1 G: 1 INJECTION, SOLUTION INTRAVENOUS at 10:17

## 2025-04-19 RX ADMIN — DULOXETINE HYDROCHLORIDE 60 MG: 60 CAPSULE, DELAYED RELEASE ORAL at 22:06

## 2025-04-19 RX ADMIN — MEROPENEM AND SODIUM CHLORIDE 1 G: 1 INJECTION, SOLUTION INTRAVENOUS at 17:57

## 2025-04-19 RX ADMIN — BUPRENORPHINE AND NALOXONE 1 FILM: 8; 2 FILM BUCCAL; SUBLINGUAL at 10:17

## 2025-04-19 RX ADMIN — BUSPIRONE HYDROCHLORIDE 15 MG: 5 TABLET ORAL at 22:06

## 2025-04-19 RX ADMIN — Medication 2 L/MIN: at 00:25

## 2025-04-19 RX ADMIN — LIDOCAINE 1 PATCH: 4 PATCH TOPICAL at 10:18

## 2025-04-19 RX ADMIN — GABAPENTIN 300 MG: 300 CAPSULE ORAL at 22:06

## 2025-04-19 RX ADMIN — GABAPENTIN 300 MG: 300 CAPSULE ORAL at 10:17

## 2025-04-19 RX ADMIN — IPRATROPIUM BROMIDE AND ALBUTEROL SULFATE 3 ML: 2.5; .5 SOLUTION RESPIRATORY (INHALATION) at 12:43

## 2025-04-19 RX ADMIN — VANCOMYCIN 1000 MG: 1 INJECTION, SOLUTION INTRAVENOUS at 23:30

## 2025-04-19 SDOH — SOCIAL STABILITY: SOCIAL INSECURITY
WITHIN THE LAST YEAR, HAVE YOU BEEN RAPED OR FORCED TO HAVE ANY KIND OF SEXUAL ACTIVITY BY YOUR PARTNER OR EX-PARTNER?: NO

## 2025-04-19 SDOH — SOCIAL STABILITY: SOCIAL INSECURITY: HAVE YOU HAD THOUGHTS OF HARMING ANYONE ELSE?: NO

## 2025-04-19 SDOH — SOCIAL STABILITY: SOCIAL INSECURITY: WITHIN THE LAST YEAR, HAVE YOU BEEN AFRAID OF YOUR PARTNER OR EX-PARTNER?: NO

## 2025-04-19 SDOH — HEALTH STABILITY: MENTAL HEALTH: HOW OFTEN DO YOU HAVE A DRINK CONTAINING ALCOHOL?: NEVER

## 2025-04-19 SDOH — ECONOMIC STABILITY: HOUSING INSECURITY: IN THE LAST 12 MONTHS, WAS THERE A TIME WHEN YOU WERE NOT ABLE TO PAY THE MORTGAGE OR RENT ON TIME?: NO

## 2025-04-19 SDOH — ECONOMIC STABILITY: FOOD INSECURITY: WITHIN THE PAST 12 MONTHS, YOU WORRIED THAT YOUR FOOD WOULD RUN OUT BEFORE YOU GOT THE MONEY TO BUY MORE.: NEVER TRUE

## 2025-04-19 SDOH — SOCIAL STABILITY: SOCIAL INSECURITY: WERE YOU ABLE TO COMPLETE ALL THE BEHAVIORAL HEALTH SCREENINGS?: YES

## 2025-04-19 SDOH — SOCIAL STABILITY: SOCIAL INSECURITY
WITHIN THE LAST YEAR, HAVE YOU BEEN KICKED, HIT, SLAPPED, OR OTHERWISE PHYSICALLY HURT BY YOUR PARTNER OR EX-PARTNER?: NO

## 2025-04-19 SDOH — HEALTH STABILITY: MENTAL HEALTH
DO YOU FEEL STRESS - TENSE, RESTLESS, NERVOUS, OR ANXIOUS, OR UNABLE TO SLEEP AT NIGHT BECAUSE YOUR MIND IS TROUBLED ALL THE TIME - THESE DAYS?: NOT AT ALL

## 2025-04-19 SDOH — ECONOMIC STABILITY: FOOD INSECURITY: WITHIN THE PAST 12 MONTHS, THE FOOD YOU BOUGHT JUST DIDN'T LAST AND YOU DIDN'T HAVE MONEY TO GET MORE.: NEVER TRUE

## 2025-04-19 SDOH — HEALTH STABILITY: MENTAL HEALTH: HOW MANY DRINKS CONTAINING ALCOHOL DO YOU HAVE ON A TYPICAL DAY WHEN YOU ARE DRINKING?: PATIENT DOES NOT DRINK

## 2025-04-19 SDOH — SOCIAL STABILITY: SOCIAL INSECURITY: WITHIN THE LAST YEAR, HAVE YOU BEEN HUMILIATED OR EMOTIONALLY ABUSED IN OTHER WAYS BY YOUR PARTNER OR EX-PARTNER?: NO

## 2025-04-19 SDOH — SOCIAL STABILITY: SOCIAL INSECURITY: DO YOU FEEL UNSAFE GOING BACK TO THE PLACE WHERE YOU ARE LIVING?: NO

## 2025-04-19 SDOH — SOCIAL STABILITY: SOCIAL INSECURITY: DOES ANYONE TRY TO KEEP YOU FROM HAVING/CONTACTING OTHER FRIENDS OR DOING THINGS OUTSIDE YOUR HOME?: NO

## 2025-04-19 SDOH — ECONOMIC STABILITY: INCOME INSECURITY: IN THE PAST 12 MONTHS HAS THE ELECTRIC, GAS, OIL, OR WATER COMPANY THREATENED TO SHUT OFF SERVICES IN YOUR HOME?: NO

## 2025-04-19 SDOH — HEALTH STABILITY: MENTAL HEALTH: HOW OFTEN DO YOU HAVE SIX OR MORE DRINKS ON ONE OCCASION?: NEVER

## 2025-04-19 SDOH — ECONOMIC STABILITY: TRANSPORTATION INSECURITY: IN THE PAST 12 MONTHS, HAS LACK OF TRANSPORTATION KEPT YOU FROM MEDICAL APPOINTMENTS OR FROM GETTING MEDICATIONS?: NO

## 2025-04-19 SDOH — ECONOMIC STABILITY: HOUSING INSECURITY: AT ANY TIME IN THE PAST 12 MONTHS, WERE YOU HOMELESS OR LIVING IN A SHELTER (INCLUDING NOW)?: NO

## 2025-04-19 SDOH — SOCIAL STABILITY: SOCIAL INSECURITY: ARE YOU OR HAVE YOU BEEN THREATENED OR ABUSED PHYSICALLY, EMOTIONALLY, OR SEXUALLY BY ANYONE?: NO

## 2025-04-19 SDOH — SOCIAL STABILITY: SOCIAL INSECURITY: DO YOU FEEL ANYONE HAS EXPLOITED OR TAKEN ADVANTAGE OF YOU FINANCIALLY OR OF YOUR PERSONAL PROPERTY?: NO

## 2025-04-19 SDOH — ECONOMIC STABILITY: FOOD INSECURITY: HOW HARD IS IT FOR YOU TO PAY FOR THE VERY BASICS LIKE FOOD, HOUSING, MEDICAL CARE, AND HEATING?: NOT VERY HARD

## 2025-04-19 SDOH — ECONOMIC STABILITY: HOUSING INSECURITY: IN THE PAST 12 MONTHS, HOW MANY TIMES HAVE YOU MOVED WHERE YOU WERE LIVING?: 0

## 2025-04-19 SDOH — SOCIAL STABILITY: SOCIAL INSECURITY: HAVE YOU HAD ANY THOUGHTS OF HARMING ANYONE ELSE?: NO

## 2025-04-19 SDOH — SOCIAL STABILITY: SOCIAL INSECURITY: ABUSE: ADULT

## 2025-04-19 SDOH — SOCIAL STABILITY: SOCIAL INSECURITY: HAS ANYONE EVER THREATENED TO HURT YOUR FAMILY OR YOUR PETS?: NO

## 2025-04-19 SDOH — SOCIAL STABILITY: SOCIAL INSECURITY: ARE THERE ANY APPARENT SIGNS OF INJURIES/BEHAVIORS THAT COULD BE RELATED TO ABUSE/NEGLECT?: NO

## 2025-04-19 ASSESSMENT — LIFESTYLE VARIABLES
AUDIT-C TOTAL SCORE: 0
AUDIT-C TOTAL SCORE: 0
SUBSTANCE_ABUSE_PAST_12_MONTHS: NO
SKIP TO QUESTIONS 9-10: 1
HOW OFTEN DO YOU HAVE A DRINK CONTAINING ALCOHOL: NEVER
HOW MANY STANDARD DRINKS CONTAINING ALCOHOL DO YOU HAVE ON A TYPICAL DAY: PATIENT DOES NOT DRINK
HOW OFTEN DO YOU HAVE 6 OR MORE DRINKS ON ONE OCCASION: NEVER
PRESCIPTION_ABUSE_PAST_12_MONTHS: NO
AUDIT-C TOTAL SCORE: 0
SKIP TO QUESTIONS 9-10: 1

## 2025-04-19 ASSESSMENT — COGNITIVE AND FUNCTIONAL STATUS - GENERAL
CLIMB 3 TO 5 STEPS WITH RAILING: A LITTLE
DRESSING REGULAR LOWER BODY CLOTHING: A LITTLE
WALKING IN HOSPITAL ROOM: A LITTLE
MOVING TO AND FROM BED TO CHAIR: A LITTLE
TOILETING: A LITTLE
CLIMB 3 TO 5 STEPS WITH RAILING: A LITTLE
DAILY ACTIVITIY SCORE: 18
PATIENT BASELINE BEDBOUND: NO
STANDING UP FROM CHAIR USING ARMS: A LITTLE
MOBILITY SCORE: 20
MOBILITY SCORE: 20
DAILY ACTIVITIY SCORE: 23
HELP NEEDED FOR BATHING: A LITTLE
EATING MEALS: A LITTLE
MOBILITY SCORE: 20
WALKING IN HOSPITAL ROOM: A LITTLE
MOVING TO AND FROM BED TO CHAIR: A LITTLE
STANDING UP FROM CHAIR USING ARMS: A LITTLE
TOILETING: A LITTLE
CLIMB 3 TO 5 STEPS WITH RAILING: A LITTLE
MOVING TO AND FROM BED TO CHAIR: A LITTLE
DAILY ACTIVITIY SCORE: 23
TOILETING: A LITTLE
PERSONAL GROOMING: A LITTLE
DRESSING REGULAR UPPER BODY CLOTHING: A LITTLE
WALKING IN HOSPITAL ROOM: A LITTLE
STANDING UP FROM CHAIR USING ARMS: A LITTLE

## 2025-04-19 ASSESSMENT — ACTIVITIES OF DAILY LIVING (ADL)
JUDGMENT_ADEQUATE_SAFELY_COMPLETE_DAILY_ACTIVITIES: YES
HEARING - LEFT EAR: FUNCTIONAL
LACK_OF_TRANSPORTATION: NO
DRESSING YOURSELF: INDEPENDENT
LACK_OF_TRANSPORTATION: NO
FEEDING YOURSELF: INDEPENDENT
HEARING - RIGHT EAR: FUNCTIONAL
BATHING: INDEPENDENT
BATHING_ASSISTANCE: STAND BY
ADL_ASSISTANCE: INDEPENDENT
PATIENT'S MEMORY ADEQUATE TO SAFELY COMPLETE DAILY ACTIVITIES?: YES
GROOMING: INDEPENDENT
ADEQUATE_TO_COMPLETE_ADL: YES
TOILETING: INDEPENDENT
WALKS IN HOME: INDEPENDENT
ADL_ASSISTANCE: INDEPENDENT

## 2025-04-19 ASSESSMENT — PAIN SCALES - GENERAL
PAINLEVEL_OUTOF10: 2
PAINLEVEL_OUTOF10: 5 - MODERATE PAIN
PAINLEVEL_OUTOF10: 7
PAINLEVEL_OUTOF10: 4
PAINLEVEL_OUTOF10: 4

## 2025-04-19 ASSESSMENT — PAIN - FUNCTIONAL ASSESSMENT
PAIN_FUNCTIONAL_ASSESSMENT: 0-10

## 2025-04-19 ASSESSMENT — PATIENT HEALTH QUESTIONNAIRE - PHQ9
1. LITTLE INTEREST OR PLEASURE IN DOING THINGS: NOT AT ALL
2. FEELING DOWN, DEPRESSED OR HOPELESS: NOT AT ALL
SUM OF ALL RESPONSES TO PHQ9 QUESTIONS 1 & 2: 0

## 2025-04-19 ASSESSMENT — ENCOUNTER SYMPTOMS
WOUND: 1
SHORTNESS OF BREATH: 1
COUGH: 1
CHILLS: 0
FEVER: 0

## 2025-04-19 ASSESSMENT — PAIN DESCRIPTION - DESCRIPTORS
DESCRIPTORS: ACHING
DESCRIPTORS: ACHING

## 2025-04-19 ASSESSMENT — PAIN DESCRIPTION - ORIENTATION: ORIENTATION: RIGHT

## 2025-04-19 ASSESSMENT — PAIN DESCRIPTION - LOCATION: LOCATION: FOOT

## 2025-04-19 NOTE — CARE PLAN
The patient's goals for the shift include      The clinical goals for the shift include Safety, monitor labs and vitals      Problem: Pain  Goal: Takes deep breaths with improved pain control throughout the shift  Outcome: Progressing  Goal: Turns in bed with improved pain control throughout the shift  Outcome: Progressing  Goal: Walks with improved pain control throughout the shift  Outcome: Progressing  Goal: Performs ADL's with improved pain control throughout shift  Outcome: Progressing  Goal: Participates in PT with improved pain control throughout the shift  Outcome: Progressing  Goal: Free from opioid side effects throughout the shift  Outcome: Progressing  Goal: Free from acute confusion related to pain meds throughout the shift  Outcome: Progressing     Problem: Fall/Injury  Goal: Not fall by end of shift  Outcome: Progressing  Goal: Be free from injury by end of the shift  Outcome: Progressing  Goal: Verbalize understanding of personal risk factors for fall in the hospital  Outcome: Progressing  Goal: Verbalize understanding of risk factor reduction measures to prevent injury from fall in the home  Outcome: Progressing  Goal: Use assistive devices by end of the shift  Outcome: Progressing  Goal: Pace activities to prevent fatigue by end of the shift  Outcome: Progressing

## 2025-04-19 NOTE — CONSULTS
"Pulmonary Consultation Note   Subjective    Reason for Consult: COPD exacerbation, ? PNA    History of Present Illness:  Gloria Bhagat is a 59 y.o. year old female patient admitted on 4/19/2025 with foot pain as well as shortness of breath and hypoxia. She has a diagnosis of COPD, PFTs last done 03/27/2019 -> Ratio of 0.62/FEV1 2.07L (70%) (no BD response)/FVC 3.30L (88%)/TLC 93%/RVtoTLC ratio 0.26/DLCO 68%. From her med dispense history she is on spiriva and albuterol at home. She reports that she gets frequent sinus infections and sometimes wheezes. Her PCP will often prescribe antibiotic and steroids together when she gets sick. She developed what she felt was a sinus infection a few days ago and was started on azithromycin and prednisone. The wound on her foot then started to worsen and she came to the hospital when she noticed it had a foul odor. In the ED she was noted to be wheezing and hypoxic which improved with duoneb treatment. Today she reports that her breathing feels \"ok\" but that she does still feel like she is congested.    Past Medical History  She has a past medical history of Anxiety and depression, Asthma, COPD (chronic obstructive pulmonary disease) (Multi), Emphysema lung (Multi), Emphysema lung (Multi), IBS (irritable bowel syndrome), Opioid dependence in remission (Multi), and Tobacco abuse.    Surgical History  She has a past surgical history that includes Colonoscopy (10/26/2017); Skull fracture elevation (N/A); Other surgical history; Hand surgery (Right); and Ankle surgery (Left).     Social History  Social History[1]    Family History  Family History[2]     Allergies  Penicillin    Review of Systems:    Meds    Scheduled medications  Scheduled Medications[3]  Continuous medications  Continuous Medications[4]  PRN medications  PRN Medications[5]     Objective    Blood pressure 111/62, pulse 92, temperature 36.2 °C (97.2 °F), temperature source Temporal, resp. rate 20, height 1.6 m " "(5' 3\"), weight 86.8 kg (191 lb 6.4 oz), SpO2 97%.   Physical Exam   GENERAL: normal appearance. well nourished. No respiratory distress  HEAD/SINUSES: + sinus tenderness, sounds congested  OROPHARYNX: Moist mucosa, no thrush or lesions  NECK: no JVD, midline trachea without stridor. Thyroid not enlarged  LYMPH NODES : none felt in the cervical, submandibular or supraclavicular regions  LUNGS: Symmetric chest. Good excursion. Diminished air movement.   CARDIAC: normal S1 and S2; no gallops, rubs or murmurs. Regular rate and rhythm  EXTREMITIES: No edema, no varicose veins  NEURO: grossly normal mental status, CN reflexes and motor strength.   SKIN: Skin turgor normal. No rashes or lesions.   PSYCH: Normal affect    Intake/Output Summary (Last 24 hours) at 4/19/2025 1743  Last data filed at 4/19/2025 1700  Gross per 24 hour   Intake 900 ml   Output 900 ml   Net 0 ml     Labs:   Results from last 72 hours   Lab Units 04/18/25  1932   SODIUM mmol/L 139   POTASSIUM mmol/L 3.6   CHLORIDE mmol/L 104   CO2 mmol/L 28   BUN mg/dL 19   CREATININE mg/dL 0.90   GLUCOSE mg/dL 113*   CALCIUM mg/dL 9.1   ANION GAP mmol/L 11   EGFR mL/min/1.73m*2 74      Results from last 72 hours   Lab Units 04/18/25  1926   WBC AUTO x10*3/uL 8.3   HEMOGLOBIN g/dL 12.8   HEMATOCRIT % 38.6   PLATELETS AUTO x10*3/uL 235   NEUTROS PCT AUTO % 65.2   LYMPHS PCT AUTO % 21.2   MONOS PCT AUTO % 9.7   EOS PCT AUTO % 3.1      Micro/ID:   Lab Results   Component Value Date    BLOODCULT Loaded on Instrument - Culture in progress 04/18/2025    BLOODCULT Loaded on Instrument - Culture in progress 04/18/2025     Summary of key imaging results from the last 24 hours   CT chest 4/18   IMPRESSION:  1. No acute pulmonary embolism to the segmental arterial level.  2. Centrilobular and paraseptal emphysema noted.  3. There are bibasilar patchy airspace and consolidative opacities  which may relate to atelectasis versus developing airspace disease.  Correlate " clinically and continued radiographic follow-up to  resolution is advised.    Impression   Gloria Bhagat is a 59 y.o. year old female patient is being seen by the pulmonary service for   COPD  Hypoxia  Possible pneumonia    Recommendations   As follows:  Antibiotics per ID  Urine strep and legionella antigen  Sputum culture if able  Start prednisone 40mg daily for 5 days  Bronchopulmonary hygiene with incentive spirometer and aerobika  Continue duonebs TID  Albuterol prn 4h if additional bronchodilators needed  Flonase for congestion  Should be d/c'ed with ICS/LABA/LAMA      Jenifer Blunt MD PhD   04/19/25 at 5:43 PM     Disclaimer: Documentation completed with the information available at the time of input. Parts of this note may have been scribed or generated using voice dictation software, Dragon.  Homophonic errors may exist.  Please contact me directly if clarification is needed. The times in the chart may not be reflective of actual patient care times, interventions, or procedures. Documentation occurs after the physical care of the patient.          [1]   Social History  Tobacco Use    Smoking status: Every Day     Current packs/day: 0.50     Average packs/day: 0.5 packs/day for 29.3 years (14.6 ttl pk-yrs)     Types: Cigarettes     Start date: 1996    Smokeless tobacco: Never   Vaping Use    Vaping status: Every Day   Substance Use Topics    Alcohol use: Not Currently     Comment: recovering; 11 years sober    Drug use: Not Currently     Comment: recovering   [2]   Family History  Problem Relation Name Age of Onset    Cervical cancer Mother      Lung cancer Father     [3] buprenorphine-naloxone, 1 Film, sublingual, q12h  busPIRone, 15 mg, oral, Nightly  cetirizine, 10 mg, oral, Daily  DULoxetine, 60 mg, oral, Nightly  enoxaparin, 40 mg, subcutaneous, q24h  famotidine, 40 mg, oral, Daily  fluticasone, 1 spray, Each Nostril, Daily  gabapentin, 300 mg, oral, BID  ipratropium-albuteroL, 3 mL,  nebulization, TID  lidocaine, 1 patch, transdermal, Daily  meropenem, 1 g, intravenous, q8h  nicotine, 1 patch, transdermal, Daily  oxygen, , inhalation, Continuous - Inhalation  predniSONE, 40 mg, oral, Daily  vancomycin, 1,000 mg, intravenous, q12h  [4]    [5] PRN medications: acetaminophen, albuterol, ketorolac, ondansetron ODT **OR** ondansetron, vancomycin

## 2025-04-19 NOTE — PROGRESS NOTES
Physical Therapy    Physical Therapy Evaluation    Patient Name: Gloria Bhagat  MRN: [id number]  Department: [address]  Room: [address] [place], [place] [address]  Today's Date: 4/19/2025   Time Calculation  Start Time: 1227  Stop Time: 1242  Time Calculation (min): 15 min    Assessment/Plan   PT Assessment  PT Assessment Results: Decreased strength, Decreased range of motion, Decreased endurance, Impaired balance, Decreased mobility, Decreased coordination, Decreased safety awareness, Impaired sensation, Decreased skin integrity, Pain  Rehab Prognosis: Good  Barriers to Discharge Home: No anticipated barriers  Evaluation/Treatment Tolerance: Patient tolerated treatment well  Medical Staff Made Aware: Yes  Strengths: Premorbid level of function  Barriers to Participation: Comorbidities  End of Session Communication: Bedside nurse  Assessment Comment: Pt moving at supervised level, cues for safety and technique, using 2WW and Rt post op shoe, low/moderate pain level. Pt would benefit from continued PT services to progress safe functional mobility.  End of Session Patient Position: Up in chair, Alarm on  IP OR SWING BED PT PLAN  Inpatient or Swing Bed: Inpatient  PT Plan  Treatment/Interventions: Transfer training, Gait training, Balance training, Strengthening, Range of motion, Therapeutic exercise, Therapeutic activity, Endurance training, Stair training  PT Plan: Ongoing PT  PT Frequency: 3 times per week  PT Discharge Recommendations: Low intensity level of continued care  Equipment Recommended upon Discharge: Wheeled walker  PT Recommended Transfer Status: Assist x1, Assistive device  PT - OK to Discharge: Yes    Subjective   General Visit Information:  General  Reason for Referral: impaired mobility, RT foot ulcer  Referred By: Cyndie TAVARES-CNP  Past Medical History Relevant to Rehab: anxiety, depression, COPD, emphysema, asthma, IBS, tobacco abuse, Lt ankle sx, vocal cord repair, skull fx, h/o ETOH and methamphetamine  "abuse  Family/Caregiver Present: No  Co-Treatment: OT  Co-Treatment Reason: Pt tolerance  Prior to Session Communication: Bedside nurse  Patient Position Received: Bed, 3 rail up, Alarm on  Preferred Learning Style: verbal, visual  General Comment: Pt is a 59 y.o. female adm for Rt foot pain, warmth, redness, odor; progressive over a month and increased over the past few days. Pt was also found to be hypoxic in the ED, was placed on 2L O2. Pt was ordered to be WBAT Rt LE w/ post op shoe. Pt had her own post op shoe, was agreeable to PT eval.  Home Living:  Home Living  Type of Home: House  Lives With: Alone  Home Adaptive Equipment: Walker rolling or standard, Cane (states \"I have a walker somewhere and I could get a cane from my aunt\")  Home Layout: Multi-level, Laundry in basement, Stairs to alternate level with rails  Alternate Level Stairs-Rails:  (one side)  Alternate Level Stairs-Number of Steps: \"20-21\" steps to basement and upstairs  Home Access: Stairs to enter with rails  Entrance Stairs-Rails:  (one side)  Entrance Stairs-Number of Steps: 3  Bathroom Shower/Tub: Tub/shower unit  Bathroom Equipment: None  Bathroom Accessibility: on 2nd story  Home Living Comments: lives in duplex w/ 3 floors, laundry in the basement.  Prior Level of Function:  Prior Function Per Pt/Caregiver Report  Level of [place]: Independent with ADLs and functional transfers, Needs assistance with homemaking  Receives Help From: Other (Comment) ()  ADL Assistance: Independent  Homemaking Assistance:  (assist for meals)  Ambulatory Assistance: Independent  Prior Function Comments:  drives pt. Pt reports h/o falls, unsure how many in the past 6 months, reports the last time was ~ 1 1/2 months ago.  Precautions:  Precautions  LE Weight Bearing Status: Weight Bearing as Tolerated (Rt post op shoe)  Medical Precautions: Fall precautions, Oxygen therapy device and L/min (2L O2)      Date/Time Vitals Session " Patient Position Pulse Resp SpO2 BP MAP (mmHg)    04/19/25 1227 During PT  Sitting  --  --  93 %  --  --     04/19/25 1243 --  --  --  --  93 %  --  --           Vital Signs Comment: w/ and w/o 2L O2     Objective   Pain:  Pain Assessment  Pain Assessment: 0-10  0-10 (Numeric) Pain Score: 4  Pain Type: Acute pain  Pain Location: Foot  Pain Orientation: Right  Cognition:  Cognition  Overall Cognitive Status: Within Functional Limits  Following Commands: Follows multistep commands without difficulty  Cognition Comments: pleasant, cooperative  Processing Speed:  (slightly)    General Assessments:     Activity Tolerance  Endurance:  (Fair)  Activity Tolerance Comments: Fair    Sensation  Sensation Comment: reports intermittent numbness and tingling at hands and feet    Strength  Strength Comments: BLEs at least 3/5  Coordination  Coordination Comment: Fair    Postural Control  Posture Comment: forward rounded, c-spine flexion    Dynamic Standing Balance  Dynamic Standing-Balance Support: Bilateral upper extremity supported  Dynamic Standing-Level of Assistance: Close supervision  Dynamic Standing-Balance: Turning (amb)  Dynamic Standing-Comments: Fair (no LOB)  Functional Assessments:  Bed Mobility  Bed Mobility: Yes  Bed Mobility 1  Bed Mobility 1: Supine to sitting  Level of Assistance 1: Distant supervision  Bed Mobility Comments 1: to Rt EOB, HOB elevated (Post op shoe Rt donned prior to amb)    Transfers  Transfer: Yes  Transfer 1  Technique 1: Stand to sit, Sit to stand  Transfer Device 1: Walker  Transfer Level of Assistance 1: Distant supervision  Trials/Comments 1: from EOB to walker, to chair w/ arms.    Ambulation/Gait Training  Ambulation/Gait Training Performed: Yes  Ambulation/Gait Training 1  Surface 1: Level tile  Device 1: Rolling walker  Assistance 1: Close supervision  Comments/Distance (ft) 1: Pt amb ~ 25' at slow pace, variable patterns of step-to and continuous, cues for gait sequence, position in  RW, erect posture, not picking up 2WW.  Extremity/Trunk Assessments:  RLE   RLE :  (grossly)  LLE   LLE :  (grossly; slight decrease in knee flexio nROM)  Outcome Measures:  [facility] Basic Mobility  Turning from your back to your side while in a flat bed without using bedrails: None  Moving from lying on your back to sitting on the side of a flat bed without using bedrails: None  Moving to and from bed to chair (including a wheelchair): A little  Standing up from a chair using your arms (e.g. wheelchair or bedside chair): A little  To walk in hospital room: A little  Climbing 3-5 steps with railing: A little  Basic Mobility - Total Score: 20    Encounter Problems       Encounter Problems (Active)       Mobility       STG - Patient will ambulate 50' w/ RW MOD I w/ safe technique (Progressing)       Start:  04/19/25    Expected End:  05/02/25            STG - Patient will ascend and descend four to six stairs w/ 1 rail and supervision w/ safe technique (Progressing)       Start:  04/19/25    Expected End:  05/02/25            Pt will consistently tolerate BLE exercises and/or therapeutic activities to promote functional strength, balance, endurance and safe mobility (Progressing)       Start:  04/19/25    Expected End:  05/02/25               PT Transfers       STG - Patient will transfer sit to and from stand MOD I w/ safe technique (Progressing)       Start:  04/19/25    Expected End:  05/02/25                   Education Documentation  Precautions, taught by Lisa Celaya, PT at 4/19/2025  2:19 PM.  Learner: Patient  Readiness: Acceptance  Method: Explanation, Demonstration  Response: Needs Reinforcement  Comment: safe mobility techniques    Mobility Training, taught by Lisa Celaya, PT at 4/19/2025  2:19 PM.  Learner: Patient  Readiness: Acceptance  Method: Explanation, Demonstration  Response: Needs Reinforcement  Comment: safe mobility techniques    Education Comments  No comments found.

## 2025-04-19 NOTE — CONSULTS
Inpatient consult to Infectious Diseases  Consult performed by: Cesar Partida MD  Consult ordered by: Cyndie Beasley, APRN-CNP            Primary MD: Rich Clifton DO    Reason For Consult  Infected right foot ulcer/pneumonia    History Of Present Illness  Gloria Bhagat is a 59 y.o. female presenting with pain swelling redness of her right foot.  She developed a right foot ulcer about a month ago.  Onset was a couple of days ago, gradual, constant, with interval worsening.  She reports associated subjective fever and chills.  She also reports cough and nasal congestion.  She came to the hospital for further care.  She was found to be hypoxic and is on supplemental oxygen.  She was seen by podiatry, conservative management advised.  She had CT of the chest which was remarkable for bibasilar opacities, atelectasis versus infiltrate.  She is on vancomycin and meropenem.  She got a dose of Rocephin.    Past Medical History  She has a past medical history of Anxiety and depression, Asthma, COPD (chronic obstructive pulmonary disease) (Multi), Emphysema lung (Multi), Emphysema lung (Multi), IBS (irritable bowel syndrome), Opioid dependence in remission (Multi), and Tobacco abuse.    Surgical History  She has a past surgical history that includes Colonoscopy (10/26/2017); Skull fracture elevation (N/A); Other surgical history; Hand surgery (Right); and Ankle surgery (Left).     Social History     Occupational History    Not on file   Tobacco Use    Smoking status: Every Day     Current packs/day: 0.50     Average packs/day: 0.5 packs/day for 29.3 years (14.6 ttl pk-yrs)     Types: Cigarettes     Start date: 1996    Smokeless tobacco: Never   Vaping Use    Vaping status: Every Day   Substance and Sexual Activity    Alcohol use: Not Currently     Comment: recovering; 11 years sober    Drug use: Not Currently     Comment: recovering    Sexual activity: Defer     Travel History   Travel since 03/19/25    No  "documented travel since 03/19/25         Family History  Family History[1]  Allergies  Penicillin     Immunization History   Administered Date(s) Administered    Moderna SARS-CoV-2 Vaccination 03/11/2021, 04/08/2021, 12/09/2021     Medications  Home medications:  Prescriptions Prior to Admission[2]  Current medications:  Scheduled medications  Scheduled Medications[3]  Continuous medications  Continuous Medications[4]  PRN medications  PRN Medications[5]    Review of Systems   Constitutional:  Negative for chills and fever.   Respiratory:  Positive for cough and shortness of breath.    Cardiovascular:  Negative for chest pain.   Skin:  Positive for rash and wound.   All other systems reviewed and are negative.       Objective  Range of Vitals (last 24 hours)  Heart Rate:  [80-98]   Temp:  [36 °C (96.8 °F)-36.8 °C (98.2 °F)]   Resp:  [11-20]   BP: (101-133)/(53-82)   Height:  [160 cm (5' 3\")]   Weight:  [86.8 kg (191 lb 6.4 oz)]   SpO2:  [88 %-97 %]   Daily Weight  04/19/25 : 86.8 kg (191 lb 6.4 oz)    Body mass index is 33.9 kg/m².     Physical Exam  Constitutional:       Appearance: Normal appearance.   HENT:      Head: Normocephalic and atraumatic.      Nose: Nose normal.   Eyes:      General: No scleral icterus.     Extraocular Movements: Extraocular movements intact.      Conjunctiva/sclera: Conjunctivae normal.   Cardiovascular:      Rate and Rhythm: Regular rhythm.      Heart sounds: Normal heart sounds.   Pulmonary:      Breath sounds: Decreased breath sounds present.   Abdominal:      General: Bowel sounds are normal.      Palpations: Abdomen is soft.      Tenderness: There is no abdominal tenderness.   Musculoskeletal:      Cervical back: Normal range of motion and neck supple.      Right lower leg: No edema.      Left lower leg: No edema.   Feet:      Comments: Right heel plantar ulcer, with large amount of granulation tissue per picture  Skin:     General: Skin is warm and dry.   Neurological:      Mental " "Status: She is alert.   Psychiatric:         Behavior: Behavior normal. Behavior is cooperative.          Relevant Results  Outside Hospital Results    Labs  Results from last 72 hours   Lab Units 04/18/25 1926   WBC AUTO x10*3/uL 8.3   HEMOGLOBIN g/dL 12.8   HEMATOCRIT % 38.6   PLATELETS AUTO x10*3/uL 235   NEUTROS PCT AUTO % 65.2   LYMPHS PCT AUTO % 21.2   MONOS PCT AUTO % 9.7   EOS PCT AUTO % 3.1     Results from last 72 hours   Lab Units 04/18/25 1932   SODIUM mmol/L 139   POTASSIUM mmol/L 3.6   CHLORIDE mmol/L 104   CO2 mmol/L 28   BUN mg/dL 19   CREATININE mg/dL 0.90   GLUCOSE mg/dL 113*   CALCIUM mg/dL 9.1   ANION GAP mmol/L 11   EGFR mL/min/1.73m*2 74     Results from last 72 hours   Lab Units 04/18/25 1932   ALK PHOS U/L 51   BILIRUBIN TOTAL mg/dL 0.4   PROTEIN TOTAL g/dL 6.6   ALT U/L 12   AST U/L 16   ALBUMIN g/dL 4.1     Estimated Creatinine Clearance: 71.2 mL/min (by C-G formula based on SCr of 0.9 mg/dL).  Sedimentation Rate   Date Value Ref Range Status   04/06/2025 5 0 - 30 mm/h Final     No results found for: \"HIV1X2\", \"HIVCONF\", \"HNJXFY1RM\"  HCV PCR Quant   Date Value Ref Range Status   11/07/2017 3,049,478 (A) IU/mL Final     Comment:     REF VALUE  NEGATIVE       Microbiology  Reviewed-blood and wound cultures pending  Imaging  CT angio chest for pulmonary embolism  Result Date: 4/18/2025  Interpreted By:  Catalina Flynn, STUDY: CT ANGIO CHEST FOR PULMONARY EMBOLISM;  4/18/2025 9:49 pm   INDICATION: Signs/Symptoms:dyspnea. hypoxemia, elevated D Dimer.   COMPARISON: CT scan of the chest 11/14/2019.   ACCESSION NUMBER(S): [id number]   ORDERING CLINICIAN: LUIS E VAUGHAN   TECHNIQUE: Helical data acquisition of the chest was obtained after intravenous administration of  50 mL of Omnipaque 350. Images were reformatted in axial, coronal, and sagittal planes. Axial and coronal MIPS were reconstructed and reviewed.   FINDINGS: HEART AND VESSELS: No acute pulmonary embolism to the  segmental " arterial level.   Main pulmonary artery and its branches are normal in caliber.   The thoracic aorta is of normal course and caliber  without vascular calcifications.   No coronary artery calcifications are seen.The study is not optimized for evaluation of coronary arteries.   The cardiac chambers are not enlarged.   No evidence of pericardial effusion.   MEDIASTINUM AND AMANDA, LOWER NECK AND AXILLA: The visualized thyroid gland is within normal limits.   No evidence of thoracic lymphadenopathy by CT criteria.   Esophagus appears within normal limits as seen.   LUNGS AND AIRWAYS: The trachea and central airways are patent. No endobronchial lesion.   Centrilobular and paraseptal emphysema noted most marked in the lung apices. There are bibasilar patchy airspace and consolidative opacities which may relate to atelectasis versus developing airspace disease. No effusion or pneumothorax.   UPPER ABDOMEN: The visualized subdiaphragmatic structures demonstrate no remarkable findings.   CHEST WALL AND OSSEOUS STRUCTURES: Remote right rib fracture deformities noted. Multilevel degenerative changes are present.       1. No acute pulmonary embolism to the segmental arterial level. 2. Centrilobular and paraseptal emphysema noted. 3. There are bibasilar patchy airspace and consolidative opacities which may relate to atelectasis versus developing airspace disease. Correlate clinically and continued radiographic follow-up to resolution is advised. 4. Additional findings as noted above.     MACRO: None   Signed by: Catalina Flynn 4/18/2025 10:33 PM Dictation workstation:   [id number]    CT foot right wo IV contrast  Result Date: 4/18/2025  Interpreted By:  Deejay Domingo, STUDY: CT FOOT RIGHT WO IV CONTRAST;  4/18/2025 7:11 pm   INDICATION: Signs/Symptoms:r/o calcaneal osteo.     COMPARISON: CT right foot 04/06/2025   ACCESSION NUMBER(S): [id number]   ORDERING CLINICIAN: WENDY ESQUIVEL   TECHNIQUE: CT imaging of the right  foot was obtained without contrast. Coronal and sagittal reformatted images were performed. 3D reconstructed images were not performed at time of dictation therefore not used during interpretation.   FINDINGS: OSSEOUS STRUCTURES: There is no osseous destruction or erosive change involving the calcaneus. There is no acute fracture or malalignment. Joint spaces are maintained aside from moderate 1st MTP joint osteoarthrosis. Plantar calcaneal spur.       SOFT TISSUES: Diffuse edema of the soft tissues. No soft tissue gas. There is a small ulceration at the plantar heel pad with adjacent induration and edema. There is likely a 1 cm x 0.9 cm fluid collection representing an abscess just deep to the ulceration.       OTHER: N/A       1. Plantar soft tissue ulceration at the heel pad associated with probable 1 cm x 0.9 cm abscess and surrounding induration and edema.   2. No osseous destruction or erosive changes of the calcaneus to suggest osteomyelitis. MRI is more sensitive for detecting early changes of osteomyelitis and may be of further diagnostic value if clinical suspicion remains high.   3. Diffuse edema of the soft tissues without evidence of gas, which is similar in degree to 04/06/2025. Correlate for cellulitis versus venous stasis edema or combination thereof.     MACRO: None.   Signed by: Deejay Domingo 4/18/2025 7:38 PM Dictation workstation:   [id number]    XR chest 1 view  Result Date: 4/18/2025  Interpreted By:  Deejay Domingo, STUDY: XR CHEST 1 VIEW;  4/18/2025 7:13 pm   INDICATION: Signs/Symptoms:dyspnea.     COMPARISON: 03/27/2019   ACCESSION NUMBER(S): [id number]   ORDERING CLINICIAN: WENDY ESQUIVEL   FINDINGS:     The cardiomediastinal silhouette and pulmonary vasculature are within normal limits. Compared to 03/27/2019 there is a new nodular opacity in the left upper lobe measuring 3.3 cm x 1.6 cm. The remainder of the lungs are clear aside from minimal bibasilar subsegmental  atelectasis.   No pleural effusion or pneumothorax.       New nodular opacity at the left lung apex measuring 3.3 cm x 1.6 cm. It demonstrates a somewhat elongated morphology. Therefore differential diagnosis includes malignancy, atelectasis and pneumonia. Unless clinical picture is consistent with pneumonia, given emphysema and increased risk factors for lung cancer, recommend CT of the chest for further evaluation, the urgency of which may be determined on a clinical basis.   MACRO: None.   Signed by: Deejay Domingo 4/18/2025 7:34 PM Dictation workstation:   [id number]    CT foot right wo IV contrast  Result Date: 4/6/2025  Interpreted By:  Cornel Batista, STUDY: CT FOOT RIGHT WO IV CONTRAST;  4/6/2025 4:50 pm   INDICATION: Signs/Symptoms:pain, r/o osteomyelitis.   COMPARISON: None.   ACCESSION NUMBER(S): [id number]   ORDERING CLINICIAN: WENDY ESQUIVEL   TECHNIQUE: CT imaging of the  right foot and ankle was obtained  without administration of intravenous contrast medium. Coronal and sagittal reformatted images were performed.   FINDINGS: OSSEOUS STRUCTURES: No acute fracture. No dislocation. No osseous erosive change. No lytic or blastic osseous lesions. Remote posttraumatic changes of the anterior calcaneal process.   SOFT TISSUES: Diffuse subcutaneous soft tissue edema and skin thickening. No evidence of a focal fluid collection within the limits of CT.       Diffuse subcutaneous soft tissue edema may reflect cellulitis, venous stasis, lymphedema, or fluid overload.   No osseous erosive change to suggest osteomyelitis. If there is persistent clinical concern, consider MRI.   MACRO: None   Signed by: Cornel Batista 4/6/2025 5:46 PM Dictation workstation:   [id number]     Assessment/Plan   Acute hypoxic respiratory failure COPD versus pneumonia  Infected right foot ulcer  Allergy to penicillin-has tolerated Rocephin as cephalosporins    Continue vancomycin-monitor levels  Continue meropenem  Follow-up  "wound culture  Local care  Offloading  Sputum culture if able  Supportive care  Monitor temperature and WBC  Further recommendations based on culture results    Cesar Partida MD         [1]   Family History  Problem Relation Name Age of Onset    Cervical cancer Mother      Lung cancer Father     [2]   Medications Prior to Admission   Medication Sig Dispense Refill Last Dose/Taking    albuterol 90 mcg/actuation inhaler Inhale 2 puffs every 4 hours if needed.       buprenorphine-naloxone (Suboxone) 8-2 mg SL film Place 0.5 Film under the tongue every 12 hours.       busPIRone (Buspar) 15 mg tablet Take 1 tablet (15 mg) by mouth once daily at bedtime. States as needed if feels anxious       cyanocobalamin, vitamin B-12, 2,500 mcg tablet, sublingual SL tablet Take 1 tablet (2,500 mcg) by mouth once daily.       DULoxetine (Cymbalta) 20 mg DR capsule Take 3 capsules (60 mg) by mouth once daily at bedtime.       ergocalciferol (Vitamin D-2) 1250 mcg (50,000 units) capsule Take 1 capsule (1.25 mg) by mouth 1 (one) time per week. friday       famotidine (Pepcid) 40 mg tablet Take 1 tablet (40 mg) by mouth once daily.       fluticasone (Flonase) 50 mcg/actuation nasal spray Administer 1 spray into each nostril once daily.       gabapentin (Neurontin) 300 mg capsule Take 1 capsule (300 mg) by mouth 2 times a day.       guaiFENesin (Mucinex) 1,200 mg tablet extended release 12hr Take 1 Tablet by mouth 2 (two) times daily as needed (cough) for up to 10 days       ibuprofen 800 mg tablet Take 1 tablet (800 mg) by mouth 3 times a day as needed.       lidocaine (Lidoderm) 5 % patch Place 1 patch on the skin.       loratadine (Claritin) 10 mg tablet Take 1 tablet (10 mg) by mouth once daily.       magnesium 200 mg tablet once daily.       [] silver-hydrocolloid dressing 1.2-2 X 2 %-\" bandage Apply 1 each topically once daily for 10 days. Consult your wound care team for additional dressing needs in the future. 10 each " 0    [3] buprenorphine-naloxone, 1 Film, sublingual, q12h  busPIRone, 15 mg, oral, Nightly  cetirizine, 10 mg, oral, Daily  DULoxetine, 60 mg, oral, Nightly  enoxaparin, 40 mg, subcutaneous, q24h  famotidine, 40 mg, oral, Daily  fluticasone, 1 spray, Each Nostril, Daily  gabapentin, 300 mg, oral, BID  ipratropium-albuteroL, 3 mL, nebulization, TID  lidocaine, 1 patch, transdermal, Daily  meropenem, 1 g, intravenous, q8h  nicotine, 1 patch, transdermal, Daily  oxygen, , inhalation, Continuous - Inhalation  vancomycin, 1,000 mg, intravenous, q12h    [4]    [5] PRN medications: acetaminophen, ketorolac, ondansetron ODT **OR** ondansetron, vancomycin

## 2025-04-19 NOTE — CONSULTS
Vancomycin Dosing by Pharmacy- INITIAL    Gloria Bhagat is a 59 y.o. year old female who Pharmacy has been consulted for vancomycin dosing for cellulitis, skin and soft tissue. Based on the patient's indication and renal status this patient will be dosed based on a goal AUC of 400-600.     Renal function is currently stable.    Visit Vitals  /56 (BP Location: Right arm, Patient Position: Lying)   Pulse 80   Temp 36.6 °C (97.9 °F) (Temporal)   Resp 17        Lab Results   Component Value Date    CREATININE 0.90 2025    CREATININE 0.89 2021    CREATININE 0.82 2018    CREATININE 0.76 2017        Patient weight is as follows:   Vitals:    25 0807   Weight: 86.8 kg (191 lb 6.4 oz)       Cultures:  No results found for the encounter in last 14 days.        I/O last 3 completed shifts:  In: -   Out: 200 [Urine:200]  I/O during current shift:  No intake/output data recorded.    Temp (24hrs), Av.7 °C (98 °F), Min:36.6 °C (97.9 °F), Max:36.8 °C (98.2 °F)         Assessment/Plan     Patient has already been given a loading dose of 1500 mg at ED on  around .  Will initiate vancomycin maintenance,  1000 mg every 12 hours.    This dosing regimen is predicted by [employer] to result in the following pharmacokinetic parameters:    Regimen: 1000 mg IV every 12 hours.  Start time: 08:50 on 2025  Exposure target: AUC24 (range)400-600 mg/L.hr   [id number]: 468 mg/L.hr  AUC24,ss: 532 mg/L.hr  Probability of AUC24 > 400: 79 %  Ctrough,ss: 17.4 mg/L  Probability of Ctrough,ss > 20: 37 %    Follow-up level will be ordered on  at 0500 unless clinically indicated sooner.  Will continue to monitor renal function daily while on vancomycin and order serum creatinine at least every 48 hours if not already ordered.  Follow for continued vancomycin needs, clinical response, and signs/symptoms of toxicity.       Jez Lipscomb, PharmD

## 2025-04-19 NOTE — CARE PLAN
The patient's goals for the shift include      The clinical goals for the shift include manage pain, monitor vitals, IV antibiotics, remain free from falls      Problem: Safety - Adult  Goal: Free from fall injury  Outcome: Progressing     Problem: Discharge Planning  Goal: Discharge to home or other facility with appropriate resources  Outcome: Progressing     Problem: Chronic Conditions and Co-morbidities  Goal: Patient's chronic conditions and co-morbidity symptoms are monitored and maintained or improved  Outcome: Progressing     Problem: Nutrition  Goal: Nutrient intake appropriate for maintaining nutritional needs  Outcome: Progressing     Problem: Pain  Goal: Takes deep breaths with improved pain control throughout the shift  Outcome: Progressing  Goal: Turns in bed with improved pain control throughout the shift  Outcome: Progressing  Goal: Walks with improved pain control throughout the shift  Outcome: Progressing  Goal: Performs ADL's with improved pain control throughout shift  Outcome: Progressing  Goal: Participates in PT with improved pain control throughout the shift  Outcome: Progressing  Goal: Free from opioid side effects throughout the shift  Outcome: Progressing  Goal: Free from acute confusion related to pain meds throughout the shift  Outcome: Progressing     Problem: Fall/Injury  Goal: Not fall by end of shift  Outcome: Progressing  Goal: Be free from injury by end of the shift  Outcome: Progressing  Goal: Verbalize understanding of personal risk factors for fall in the hospital  Outcome: Progressing  Goal: Verbalize understanding of risk factor reduction measures to prevent injury from fall in the home  Outcome: Progressing  Goal: Use assistive devices by end of the shift  Outcome: Progressing  Goal: Pace activities to prevent fatigue by end of the shift  Outcome: Progressing

## 2025-04-19 NOTE — PROGRESS NOTES
Occupational Therapy    Evaluation    Patient Name: Gloria Bhagat  MRN: [id number]  Department: [address]  Room: [facility]438-A  Today's Date: 4/19/2025  Time Calculation  Start Time: 1221  Stop Time: 1240  Time Calculation (min): 19 min    Assessment  IP OT Assessment  OT Assessment: 60 y/o female presenting with Rt foot pain/swelling and SOB. Admitted for mgmt of Rt heel wound, PNA, COPDE.  per podiatry pt is WBAT RLE in post op shoe (pt had her own post op shoe present).  On eval, she requires increased assist for xfers, mobility, and self care d/t decreased strength, balance, activity tolerance. Skilled OT services are required to maximize safety/IND prior to DC.  Prognosis: Good  Barriers to Discharge Home: Caregiver assistance, Physical needs  Caregiver Assistance: Patient lives alone and/or does not have reliable caregiver assistance  Physical Needs: Intermittent mobility assistance needed, Stair navigation to access bath limited by function/safety, Stair navigation to access bed limited by function/safety, Stair navigation into home limited by function/safety, Intermittent ADL assistance needed  Evaluation/Treatment Tolerance: Patient tolerated treatment well  Medical Staff Made Aware: Yes  End of Session Communication: Bedside nurse  End of Session Patient Position: Up in chair, Alarm on  Plan:  Treatment Interventions: ADL retraining, Functional transfer training, UE strengthening/ROM, Endurance training, Patient/family training, Equipment evaluation/education, Neuromuscular reeducation, Compensatory technique education  OT Frequency: 3 times per week  OT Discharge Recommendations: Low intensity level of continued care  Equipment Recommended upon Discharge: Wheeled walker  OT Recommended Transfer Status: Assist of 1  OT - OK to Discharge: Yes    Subjective   Current Problem:  1. Right foot ulcer, with unspecified severity (Multi)          General:  General  Reason for Referral: Decreased ADLS, Rt heel wound,  "acute hypoxic respiratory failure, PNA and COPDE  Referred By: Dr. Philip  Past Medical History Relevant to Rehab: anxiety/depression, COPD, asthma, emphysema, hx of opiod dependence (in remission)  Family/Caregiver Present: No  Co-Treatment: PT  Co-Treatment Reason: partial co eval for patient safety and advancement of activity  Prior to Session Communication: Bedside nurse  Patient Position Received: Bed, 3 rail up, Alarm on  Preferred Learning Style: verbal, visual  General Comment: Cleared by nsg, pt met in supine, agreeable to OT session  Precautions:  Hearing/Visual Limitations: vision and hearing WFL  LE Weight Bearing Status: Weight Bearing as Tolerated (RLE WBAT in post op shoe)  Medical Precautions: Fall precautions, Oxygen therapy device and L/min (on 2L O2 via NC)  Precautions Comment: RLE WBAT in post op shoe     Date/Time Vitals Session Patient Position Pulse Resp SpO2 BP MAP (mmHg)    04/19/25 1221 During OT  --  --  --  93 %  --  --      Vital Signs Comment: Spo2 on room air ~93%, remained the same with 2L donned    Pain:  Pain Assessment  Pain Assessment: 0-10  0-10 (Numeric) Pain Score: 4  Pain Type: Acute pain  Pain Location: Foot  Pain Orientation: Right    Objective   Cognition:  Overall Cognitive Status: Within Functional Limits  Orientation Level: Oriented X4  Following Commands: Follows multistep commands with increased time  Cognition Comments: cognition grossly WFL.  decreased insight at times. easily distracted    Home Living:  Type of Home: House  Lives With: Alone  Home Adaptive Equipment: Walker rolling or standard, Cane (states \"I have a walker somewhere and I could get a cane from my aunt\")  Home Layout: Multi-level, Laundry in basement, Stairs to alternate level with rails  Alternate Level Stairs-Rails:  (unilateral)  Alternate Level Stairs-Number of Steps: \"20-21\" steps to basement and upstairs  Home Access: Stairs to enter with rails  Entrance Stairs-Rails:  " "(unilateral)  Entrance Stairs-Number of Steps: 3 SCOTT  Bathroom Shower/Tub: Tub/shower unit  Bathroom Equipment: None  Bathroom Accessibility: on 2nd story  Home Living Comments: lives in a duplex, has \"3 floors and laundry in the basement\"     Prior Function:  Level of [place]: Independent with ADLs and functional transfers, Needs assistance with homemaking  Receives Help From: Other (Comment) ()  ADL Assistance: Independent  Homemaking Assistance:  (has \"[employer],\" provides patient 1 meal/day)  Ambulatory Assistance:  (typically IND without a device)  Prior Function Comments: has a  who provides transportation as needed    ADL:  Eating Assistance: Stand by  Eating Deficit: Setup  Grooming Assistance: Stand by  Grooming Deficit: Setup  Bathing Assistance: Stand by  Bathing Deficit: Supervision/safety  UE Dressing Assistance: Stand by  UE Dressing Deficit: Setup  LE Dressing Assistance: Minimal  LE Dressing Deficit: Don/doff R sock, Don/doff R shoe (Rt post op shoe mgmt)  Toileting Assistance with Device: Stand by  Toileting Deficit: Supervison/safety    Activity Tolerance:  Endurance: Decreased tolerance for upright activites  Activity Tolerance Comments: on 2L O2 via NC, does not wear at baseline    Bed Mobility/Transfers:   Bed Mobility  Bed Mobility: Yes  Bed Mobility 1  Bed Mobility 1: Supine to sitting  Level of Assistance 1: Distant supervision  Bed Mobility Comments 1: HOB elevated    Transfers  Transfer: Yes  Transfer 1  Technique 1: Stand to sit, Sit to stand  Transfer Device 1: Walker  Transfer Level of Assistance 1: Distant supervision  Trials/Comments 1: cues on walker mgmt and safety    Functional Mobility:  Functional Mobility  Functional Mobility Performed: Yes  Functional Mobility 1  Surface 1: Level tile  Device 1: Rolling walker  Assistance 1: Close supervision, Contact guard  Comments 1: short household distance within hospital room, intermittent CGA for stability " "with cues on walker mgmt/safety. tends to walk on ball of Lt foot. educated on donning appropriate footwear on Lt foot while ambulating with Rt post op shoe to account for height differential    Sitting Balance:  Static Sitting Balance  Static Sitting-Balance Support: Feet supported, Bilateral upper extremity supported  Static Sitting-Level of Assistance: Independent  Static Sitting-Comment/Number of Minutes: EOB sitting    Vision: Vision - Basic Assessment  Current Vision: Does not wear glasses  Sensation:  Light Touch: Partial deficits in the RUE, Partial deficits in the LUE, Partial deficits in the RLE, Partial deficits in the LLE  Sensation Comment: reports \"the tingling comes and goes\"  Strength:  Strength Comments: BUEs at least >/= 3/5, observed functionally  Coordination:  Movements are Fluid and Coordinated: No  Upper Body Coordination: WFL  Lower Body Coordination: RLE decreased slightly   Hand Function:  Hand Function  Gross Grasp: Functional  Coordination: Functional  Extremities: RUE   RUE : Within Functional Limits and LUE   LUE: Within Functional Limits    Outcome Measures: [facility] Daily Activity  Putting on and taking off regular lower body clothing: A little  Bathing (including washing, rinsing, drying): A little  Putting on and taking off regular upper body clothing: A little  Toileting, which includes using toilet, bedpan or urinal: A little  Taking care of personal grooming such as brushing teeth: A little  Eating Meals: A little  Daily Activity - Total Score: 18      Education Documentation  Body Mechanics, taught by Arthur Bergman OT at 4/19/2025  2:01 PM.  Learner: Patient  Readiness: Acceptance  Method: Explanation  Response: Needs Reinforcement  Comment: Initiated ADL retraining, educated re: fall precautions, safety techniques, OT POC    Precautions, taught by Arthur Bergman OT at 4/19/2025  2:01 PM.  Learner: Patient  Readiness: Acceptance  Method: Explanation  Response: Needs " Reinforcement  Comment: Initiated ADL retraining, educated re: fall precautions, safety techniques, OT POC    ADL Training, taught by Arthur Bergman OT at 4/19/2025  2:01 PM.  Learner: Patient  Readiness: Acceptance  Method: Explanation  Response: Needs Reinforcement  Comment: Initiated ADL retraining, educated re: fall precautions, safety techniques, OT POC    Education Comments  No comments found.      Goals:   Encounter Problems       Encounter Problems (Active)       OT Goals       ADLS (Progressing)       Start:  04/19/25    Expected End:  05/03/25       Patient will complete ADL tasks, with modified independence using AE as needed in order to increase patient's safety and independence with self-care tasks.           Functional Transfers (Progressing)       Start:  04/19/25    Expected End:  05/03/25       Patient will complete functional transfers with modified independence using least restrictive device, in order to increase patient's safety and independence with daily tasks.           Activity Tolerance (Progressing)       Start:  04/19/25    Expected End:  05/03/25       Patient will demonstrate the ability to participate in functional activity at least >/= 20 minutes in order to increase patient's safety and independence with daily tasks.

## 2025-04-19 NOTE — PROGRESS NOTES
"Gloria Bhagat is a 59 y.o. female on day 0 of admission presenting with persistent right plantar foot ulcer with recurrent odor and swelling and pain after completing outpatient antibiotics.  Patient states she is not scheduled to see the podiatrist until later this week.    Patient also has history of COPD and was noted to be wheezing and hypoxemic on arrival.  Denying any chest pain.  Patient initially evaluated by Dr. Hernandez.  Please see his note for full details of the H&P.  Patient signed out to me at change of shift pending test results.    Subjective   Patient states she is breathing better on the oxygen and after the breathing treatments.  She continues to deny any chest pain.  She does not believe she has run a fever.       Objective     Physical Exam  Vitals reviewed.   Constitutional:       Appearance: Normal appearance.   HENT:      Head: Normocephalic.   Cardiovascular:      Rate and Rhythm: Normal rate and regular rhythm.      Pulses: Normal pulses.      Heart sounds: Normal heart sounds.   Pulmonary:      Effort: Pulmonary effort is normal.      Breath sounds: Normal breath sounds.   Musculoskeletal:      Cervical back: Normal range of motion.   Neurological:      General: No focal deficit present.      Mental Status: She is alert.         Last Recorded Vitals  Blood pressure 106/79, pulse 92, temperature 36.8 °C (98.2 °F), temperature source Temporal, resp. rate 16, height 1.6 m (5' 3\"), weight 86.8 kg (191 lb 6.4 oz), SpO2 95%.  Intake/Output last 3 Shifts:  No intake/output data recorded.    Relevant Results                 Labs Reviewed   CBC WITH AUTO DIFFERENTIAL - Abnormal       Result Value    WBC 8.3      nRBC 0.0      RBC 3.87 (*)     Hemoglobin 12.8      Hematocrit 38.6            MCH 33.1      MCHC 33.2      RDW 13.3      Platelets 235      Neutrophils % 65.2      Immature Granulocytes %, Automated 0.2      Lymphocytes % 21.2      Monocytes % 9.7      Eosinophils % 3.1      " Basophils % 0.6      Neutrophils Absolute 5.41      Immature Granulocytes Absolute, Automated 0.02      Lymphocytes Absolute 1.76      Monocytes Absolute 0.81      Eosinophils Absolute 0.26      Basophils Absolute 0.05     COMPREHENSIVE METABOLIC PANEL - Abnormal    Glucose 113 (*)     Sodium 139      Potassium 3.6      Chloride 104      Bicarbonate 28      Anion Gap 11      Urea Nitrogen 19      Creatinine 0.90      eGFR 74      Calcium 9.1      Albumin 4.1      Alkaline Phosphatase 51      Total Protein 6.6      AST 16      Bilirubin, Total 0.4      ALT 12     BLOOD GAS ARTERIAL FULL PANEL - Abnormal    POCT pH, Arterial 7.45 (*)     POCT pCO2, Arterial 40      POCT pO2, Arterial 53 (*)     POCT SO2, Arterial 90 (*)     POCT Oxy Hemoglobin, Arterial 80.9 (*)     POCT Hematocrit Calculated, Arterial 39.0      POCT Sodium, Arterial 137      POCT Potassium, Arterial 3.9      POCT Chloride, Arterial 108 (*)     POCT Ionized Calcium, Arterial 1.20      POCT Glucose, Arterial 120 (*)     POCT Lactate, Arterial 0.5      POCT Base Excess, Arterial 3.5 (*)     POCT HCO3 Calculated, Arterial 27.8 (*)     POCT Hemoglobin, Arterial 13.1      POCT Anion Gap, Arterial 5 (*)     Patient Temperature        FiO2 21      Site of Arterial Puncture Radial Right      Marlo's Test Positive     D-DIMER, VTE EXCLUSION - Abnormal    D-Dimer, Quantitative VTE Exclusion 863 (*)     Narrative:     The VTE Exclusion D-Dimer assay is reported in ng/mL Fibrinogen Equivalent Units (FEU).    Per 's instructions for use, a value of less than 500 ng/mL (FEU) may help to exclude DVT or PE in outpatients when the assay is used with a clinical pretest probability assessment.(AEMR must utilize and document eCalc 'Wells Score Deep Vein Thrombosis Risk' for DVT exclusion only. Emergency Department should utilize  Guidelines for Emergency Department Use of the VTE Exclusion D-Dimer and Clinical Pretest probability assessment model for DVT or  PE exclusion.)   B-TYPE NATRIURETIC PEPTIDE - Normal    BNP 20      Narrative:        <100 pg/mL - Heart failure unlikely  100-299 pg/mL - Intermediate probability of acute heart                  failure exacerbation. Correlate with clinical                  context and patient history.    >=300 pg/mL - Heart Failure likely. Correlate with clinical                  context and patient history.    BNP testing is performed using different testing methodology at [facility] than at other [facility]. Direct result comparisons should only be made within the same method.      SERIAL TROPONIN-INITIAL - Normal    Troponin I, High Sensitivity 5      Narrative:     Less than 99th percentile of normal range cutoff-  Female and children under 18 years old <14 ng/L; Male <21 ng/L: Negative  Repeat testing should be performed if clinically indicated.     Female and children under 18 years old 14-50 ng/L; Male 21-50 ng/L:  Consistent with possible cardiac damage and possible increased clinical   risk. Serial measurements may help to assess extent of myocardial damage.     >50 ng/L: Consistent with cardiac damage, increased clinical risk and  myocardial infarction. Serial measurements may help assess extent of   myocardial damage.      NOTE: Children less than 1 year old may have higher baseline troponin   levels and results should be interpreted in conjunction with the overall   clinical context.     NOTE: Troponin I testing is performed using a different   testing methodology at [facility] than at other   [facility]. Direct result comparisons should only   be made within the same method.   SERIAL TROPONIN, 1 HOUR - Normal    Troponin I, High Sensitivity 5      Narrative:     Less than 99th percentile of normal range cutoff-  Female and children under 18 years old <14 ng/L; Male <21 ng/L: Negative  Repeat testing should be performed if clinically indicated.     Female and children under 18  years old 14-50 ng/L; Male 21-50 ng/L:  Consistent with possible cardiac damage and possible increased clinical   risk. Serial measurements may help to assess extent of myocardial damage.     >50 ng/L: Consistent with cardiac damage, increased clinical risk and  myocardial infarction. Serial measurements may help assess extent of   myocardial damage.      NOTE: Children less than 1 year old may have higher baseline troponin   levels and results should be interpreted in conjunction with the overall   clinical context.     NOTE: Troponin I testing is performed using a different   testing methodology at [facility] than at other   [facility]. Direct result comparisons should only   be made within the same method.   TISSUE/WOUND CULTURE/SMEAR   BLOOD CULTURE   BLOOD CULTURE   TROPONIN SERIES- (INITIAL, 1 HR)    Narrative:     The following orders were created for panel order Troponin Series, (0, 1 HR).  Procedure                               Abnormality         Status                     ---------                               -----------         ------                     Troponin I, High Sensiti...[[id number]]  Normal              Final result               Troponin, High Sensitivi...[[id number]]  Normal              Final result                 Please view results for these tests on the individual orders.   GRAY TOP    Extra Tube Hold for add-ons.     URINALYSIS WITH REFLEX CULTURE AND MICROSCOPIC    Narrative:     The following orders were created for panel order Urinalysis with Reflex Culture and Microscopic.  Procedure                               Abnormality         Status                     ---------                               -----------         ------                     Urinalysis with Reflex C...[[id number]]                                                 Extra Urine Gray Tube[[id number]]                                                         Please view results for these tests on the  individual orders.   URINALYSIS WITH REFLEX CULTURE AND MICROSCOPIC   EXTRA URINE GRAY TUBE     ED Medication Administration from 04/18/2025 1819 to 04/19/2025 0024         Date/Time Order Dose Route Action Action by     04/18/2025 1935 EDT ipratropium-albuteroL (Duo-Neb) 0.5-2.5 mg/3 mL nebulizer solution 3 mL 3 mL nebulization Given ELOISA Dumont     04/18/2025 1941 EDT vancomycin 1,500 mg in dextrose 5% 500 mL IV 1,500 mg intravenous New Bag [facility], B     04/18/2025 1954 EDT oxygen (O2) therapy 2 L/min inhalation Start [facility], B     04/18/2025 1959 EDT oxygen (O2) therapy 2 L/min inhalation Start [facility], B     04/18/2025 2138 EDT iohexol (OMNIPaque) 350 mg iodine/mL solution 50 mL 50 mL intravenous Given MARLA Fuller     04/18/2025 2225 EDT vancomycin 1,500 mg in dextrose 5% 500 mL IV 0 mg intravenous Stopped [facility], B     04/18/2025 2259 EDT azithromycin (Zithromax) 500 mg in dextrose 5% 250 mL  mg intravenous New Bag [facility], B     04/18/2025 2259 EDT cefTRIAXone (Rocephin) 1 g in dextrose (iso) IV 50 mL 1 g intravenous New Bag [facility], B     04/18/2025 2300 EDT azithromycin (Zithromax) injection 500 mg  - Omnicell Override Pull --  Override Pull [facility],      04/19/2025 0022 EDT ipratropium-albuteroL (Duo-Neb) 0.5-2.5 mg/3 mL nebulizer solution 3 mL 3 mL nebulization Not Given ELOISA Dumont     04/19/2025 0022 EDT oxygen (O2) therapy 2 L/min inhalation Start ELOISA Dumont          CT angio chest for pulmonary embolism   Final Result   1. No acute pulmonary embolism to the segmental arterial level.   2. Centrilobular and paraseptal emphysema noted.   3. There are bibasilar patchy airspace and consolidative opacities   which may relate to atelectasis versus developing airspace disease.   Correlate clinically and continued radiographic follow-up to   resolution is advised.   4. Additional findings as noted above.             MACRO:   None        Signed by: Catalina Flynn 4/18/2025 10:33 PM   Dictation  workstation:   [id number]      XR chest 1 view   Final Result   New nodular opacity at the left lung apex measuring 3.3 cm x 1.6 cm.   It demonstrates a somewhat elongated morphology. Therefore   differential diagnosis includes malignancy, atelectasis and   pneumonia. Unless clinical picture is consistent with pneumonia,   given emphysema and increased risk factors for lung cancer, recommend   CT of the chest for further evaluation, the urgency of which may be   determined on a clinical basis.        MACRO:   None.        Signed by: Deejay Domingo 4/18/2025 7:34 PM   Dictation workstation:   [id number]      CT foot right wo IV contrast   Final Result   1. Plantar soft tissue ulceration at the heel pad associated with   probable 1 cm x 0.9 cm abscess and surrounding induration and edema.        2. No osseous destruction or erosive changes of the calcaneus to   suggest osteomyelitis. MRI is more sensitive for detecting early   changes of osteomyelitis and may be of further diagnostic value if   clinical suspicion remains high.        3. Diffuse edema of the soft tissues without evidence of gas, which   is similar in degree to 04/06/2025. Correlate for cellulitis versus   venous stasis edema or combination thereof.             MACRO:   None.        Signed by: Deejay Domingo 4/18/2025 7:38 PM   Dictation workstation:   [id number]            EKG  1921 --twelve-lead EKG was obtained and read by me. This demonstrates normal sinus rhythm with a rate of 99, normal intervals, normal axes, no ectopy, no ischemia, no pericarditis. There was no change compared to most recent prior EKG. 6/23/21         Assessment & Plan  Right foot ulcer, with unspecified severity (Multi)    Pulmonary emphysema, unspecified emphysema type (Multi)    Nodule of upper lobe of left lung    COPD exacerbation (Multi)    Pneumonia of both lower lobes due to infectious organism    Patient presents emergency department with the above history and  physical.  No signs of sepsis, dehydration.  Patient was hypoxemic on presentation but is doing well on 2 L of nasal cannula with wheezing resolved after breathing treatment.  Chest x-ray showed no acute cardiopulmonary disease But 3 x 1 nodular appearing density in the left apex for which CT imaging was recommended according to radiologist interpretation.  Additionally D-dimer was elevated; therefore, CT angio chest ordered.  This was read by radiology This shows no evidence of pulmonary embolism, normal thoracic aorta, patchy bilateral airspace disease atelectasis versus infiltrate, emphysema.  Rocephin and Zithromax ordered.    CT scan of the patient's right foot also obtained.  This shows no evidence of osteomyelitis, soft tissue swelling, plantar ulcer with possible fluid collection deep to the ulcer.  Vancomycin had been already given by Dr. Hernandez after wound cultures were obtained.    Patient understands that due to her new oxygen requirement she will need to be in the hospital.  For her benefit it would be best to be at a facility that had also podiatry capabilities.  Patient states that she would prefer to stay as close to home as possible and initial request was for [facility].    2142 --Case was discussed with Dr. Uriostegui hospitalist at [facility] including patient's past medical history, presenting signs and symptoms, current clinical condition and test results.  She states that unfortunately their facility does not have any podiatry coverage for the rest of this month so must decline the patient.    0016 --Case was discussed by phone with Dr. Lozada hospitalist at [facility] including patient's past medical history, presenting signs and symptoms, current clinical condition and test results.  He has graciously accepted patient for transfer.      I spent 0 minutes in the critical care of this patient.      Chiara Ferrer MD

## 2025-04-19 NOTE — H&P
History Of Present Illness  Gloria Bhagat is a 59 y.o. female presented from [facility] for complaints of right foot pain, warmth, redness, foul odor from her heel wound with some SOB. She states she has been dealing with the right foot wound for about 1 month now. She was following with podiatry outpatient, denies any recent use of abx. She admits to increased warmth, redness and drainage with increased pain from the wound over the past few days with subjective chills and fevers prompting her to go to the ED. She does admit to upper respiratory complaints of cough and nasal congestion. On arrival to the ED she was found to be hypoxic and wheezing, she was placed on supplemental O2 and given breathing treatment with some relief. She denies any use of home oxygen. She denies any chest pain, abd pain, nausea, vomiting, diarrhea, constipation or urinary symptoms.     ED: Azithromycin, Ceftriaxone, vancomycin, duoneb x1.     She will be admitted for further work up and management of infected right heel wound with cellulitis, acute hypoxic respiratory failure, pneumonia and COPD exacerbation.      Past Medical History  Medical History[1]    Surgical History  Surgical History[2]     Social History  Home alone. States she smokes cigarettes, 1ppd. Denies any drug or ETOH use- she is sober 11 years now from ETOH and methamphetamines.     Family History  Family History[3]     Allergies  Penicillin    Review of Systems     Please see pertinent positives in the HPI and past medical and surgical histories.     Physical Exam     General: Pleasant, awake, alert.   HEENT: PERRLA, no facial asymmetry noted. Normocephalic, mucous membranes moist.   Neck: No JVD, supple.  Cardiovascular: Regular rate and rhythm. Normal S1 and S2. No murmurs, rubs or gallops.   Respiratory: Diminished on 2L NC.   Abdomen: Soft, round, non-tender to palpation. Bowel sounds present and normoactive.  Extremities: No peripheral cyanosis. 1-2+ BLE  "edema present with some faint erythema to bilateral shins.   Neurologic: Alert and oriented to person, place and time. Normal sensation.   Dermatologic: No rash, ecchymosis, or jaundice. She has ulceration present to the R heel with some faint surrounding erythema.  Psychological: Appropriate affect and behavior.         Last Recorded Vitals  Blood pressure 113/56, pulse 80, temperature 36.6 °C (97.9 °F), temperature source Temporal, resp. rate 17, height 1.6 m (5' 3\"), weight 86.8 kg (191 lb 6.4 oz), SpO2 97%.    Relevant Results    Results for orders placed or performed during the hospital encounter of 04/18/25 (from the past 24 hours)   CBC and Auto Differential   Result Value Ref Range    WBC 8.3 4.4 - 11.3 x10*3/uL    nRBC 0.0 0.0 - 0.0 /100 WBCs    RBC 3.87 (L) 4.00 - 5.20 x10*6/uL    Hemoglobin 12.8 12.0 - 16.0 g/dL    Hematocrit 38.6 36.0 - 46.0 %     80 - 100 fL    MCH 33.1 26.0 - 34.0 pg    MCHC 33.2 32.0 - 36.0 g/dL    RDW 13.3 11.5 - 14.5 %    Platelets 235 150 - 450 x10*3/uL    Neutrophils % 65.2 40.0 - 80.0 %    Immature Granulocytes %, Automated 0.2 0.0 - 0.9 %    Lymphocytes % 21.2 13.0 - 44.0 %    Monocytes % 9.7 2.0 - 10.0 %    Eosinophils % 3.1 0.0 - 6.0 %    Basophils % 0.6 0.0 - 2.0 %    Neutrophils Absolute 5.41 1.20 - 7.70 x10*3/uL    Immature Granulocytes Absolute, Automated 0.02 0.00 - 0.70 x10*3/uL    Lymphocytes Absolute 1.76 1.20 - 4.80 x10*3/uL    Monocytes Absolute 0.81 0.10 - 1.00 x10*3/uL    Eosinophils Absolute 0.26 0.00 - 0.70 x10*3/uL    Basophils Absolute 0.05 0.00 - 0.10 x10*3/uL   B-type natriuretic peptide   Result Value Ref Range    BNP 20 0 - 99 pg/mL   D-Dimer, VTE Exclusion   Result Value Ref Range    D-Dimer, Quantitative VTE Exclusion 863 (H) <=500 ng/mL FEU   Comprehensive metabolic panel   Result Value Ref Range    Glucose 113 (H) 74 - 99 mg/dL    Sodium 139 136 - 145 mmol/L    Potassium 3.6 3.5 - 5.3 mmol/L    Chloride 104 98 - 107 mmol/L    Bicarbonate 28 21 - " 32 mmol/L    Anion Gap 11 10 - 20 mmol/L    Urea Nitrogen 19 6 - 23 mg/dL    Creatinine 0.90 0.50 - 1.05 mg/dL    eGFR 74 >60 mL/min/1.73m*2    Calcium 9.1 8.6 - 10.3 mg/dL    Albumin 4.1 3.4 - 5.0 g/dL    Alkaline Phosphatase 51 33 - 110 U/L    Total Protein 6.6 6.4 - 8.2 g/dL    AST 16 9 - 39 U/L    Bilirubin, Total 0.4 0.0 - 1.2 mg/dL    ALT 12 7 - 45 U/L   Troponin I, High Sensitivity, Initial   Result Value Ref Range    Troponin I, High Sensitivity 5 0 - 13 ng/L   Red Top   Result Value Ref Range    Extra Tube Hold for add-ons.    Gray Top   Result Value Ref Range    Extra Tube Hold for add-ons.    Blood Gas Arterial Full Panel   Result Value Ref Range    POCT pH, Arterial 7.45 (H) 7.38 - 7.42 pH    POCT pCO2, Arterial 40 38 - 42 mm Hg    POCT pO2, Arterial 53 (L) 85 - 95 mm Hg    POCT SO2, Arterial 90 (L) 94 - 100 %    POCT Oxy Hemoglobin, Arterial 80.9 (L) 94.0 - 98.0 %    POCT Hematocrit Calculated, Arterial 39.0 36.0 - 46.0 %    POCT Sodium, Arterial 137 136 - 145 mmol/L    POCT Potassium, Arterial 3.9 3.5 - 5.3 mmol/L    POCT Chloride, Arterial 108 (H) 98 - 107 mmol/L    POCT Ionized Calcium, Arterial 1.20 1.10 - 1.33 mmol/L    POCT Glucose, Arterial 120 (H) 74 - 99 mg/dL    POCT Lactate, Arterial 0.5 0.4 - 2.0 mmol/L    POCT Base Excess, Arterial 3.5 (H) -2.0 - 3.0 mmol/L    POCT HCO3 Calculated, Arterial 27.8 (H) 22.0 - 26.0 mmol/L    POCT Hemoglobin, Arterial 13.1 12.0 - 16.0 g/dL    POCT Anion Gap, Arterial 5 (L) 10 - 25 mmo/L    Patient Temperature      FiO2 21 %    Site of Arterial Puncture Radial Right     Marlo's Test Positive    Troponin, High Sensitivity, 1 Hour   Result Value Ref Range    Troponin I, High Sensitivity 5 0 - 13 ng/L   Urinalysis with Reflex Culture and Microscopic   Result Value Ref Range    Color, Urine Straw Straw, Yellow    Appearance, Urine Clear Clear    Specific Gravity, Urine 1.014 1.005 - 1.035    pH, Urine 6.0 5.0, 5.5, 6.0, 6.5, 7.0, 7.5, 8.0    Protein, Urine NEGATIVE  NEGATIVE mg/dL    Glucose, Urine NEGATIVE NEGATIVE mg/dL    Blood, Urine NEGATIVE NEGATIVE mg/dL    Ketones, Urine NEGATIVE NEGATIVE mg/dL    Bilirubin, Urine NEGATIVE NEGATIVE mg/dL    Urobilinogen, Urine <2.0 <2.0 mg/dL    Nitrite, Urine NEGATIVE NEGATIVE    Leukocyte Esterase, Urine NEGATIVE NEGATIVE        CT angio chest for pulmonary embolism  Result Date: 4/18/2025  Interpreted By:  Catalina Flynn, STUDY: CT ANGIO CHEST FOR PULMONARY EMBOLISM;  4/18/2025 9:49 pm   INDICATION: Signs/Symptoms:dyspnea. hypoxemia, elevated D Dimer.   COMPARISON: CT scan of the chest 11/14/2019.   ACCESSION NUMBER(S): [id number]   ORDERING CLINICIAN: LUIS E VAUGHAN   TECHNIQUE: Helical data acquisition of the chest was obtained after intravenous administration of  50 mL of Omnipaque 350. Images were reformatted in axial, coronal, and sagittal planes. Axial and coronal MIPS were reconstructed and reviewed.   FINDINGS: HEART AND VESSELS: No acute pulmonary embolism to the  segmental arterial level.   Main pulmonary artery and its branches are normal in caliber.   The thoracic aorta is of normal course and caliber  without vascular calcifications.   No coronary artery calcifications are seen.The study is not optimized for evaluation of coronary arteries.   The cardiac chambers are not enlarged.   No evidence of pericardial effusion.   MEDIASTINUM AND AMANDA, LOWER NECK AND AXILLA: The visualized thyroid gland is within normal limits.   No evidence of thoracic lymphadenopathy by CT criteria.   Esophagus appears within normal limits as seen.   LUNGS AND AIRWAYS: The trachea and central airways are patent. No endobronchial lesion.   Centrilobular and paraseptal emphysema noted most marked in the lung apices. There are bibasilar patchy airspace and consolidative opacities which may relate to atelectasis versus developing airspace disease. No effusion or pneumothorax.   UPPER ABDOMEN: The visualized subdiaphragmatic structures demonstrate  no remarkable findings.   CHEST WALL AND OSSEOUS STRUCTURES: Remote right rib fracture deformities noted. Multilevel degenerative changes are present.       1. No acute pulmonary embolism to the segmental arterial level. 2. Centrilobular and paraseptal emphysema noted. 3. There are bibasilar patchy airspace and consolidative opacities which may relate to atelectasis versus developing airspace disease. Correlate clinically and continued radiographic follow-up to resolution is advised. 4. Additional findings as noted above.     MACRO: None   Signed by: Catalina Flynn 4/18/2025 10:33 PM Dictation workstation:   [id number]    CT foot right wo IV contrast  Result Date: 4/18/2025  Interpreted By:  Deejay Domingo, STUDY: CT FOOT RIGHT WO IV CONTRAST;  4/18/2025 7:11 pm   INDICATION: Signs/Symptoms:r/o calcaneal osteo.     COMPARISON: CT right foot 04/06/2025   ACCESSION NUMBER(S): [id number]   ORDERING CLINICIAN: WENDY ESQUIVEL   TECHNIQUE: CT imaging of the right foot was obtained without contrast. Coronal and sagittal reformatted images were performed. 3D reconstructed images were not performed at time of dictation therefore not used during interpretation.   FINDINGS: OSSEOUS STRUCTURES: There is no osseous destruction or erosive change involving the calcaneus. There is no acute fracture or malalignment. Joint spaces are maintained aside from moderate 1st MTP joint osteoarthrosis. Plantar calcaneal spur.       SOFT TISSUES: Diffuse edema of the soft tissues. No soft tissue gas. There is a small ulceration at the plantar heel pad with adjacent induration and edema. There is likely a 1 cm x 0.9 cm fluid collection representing an abscess just deep to the ulceration.       OTHER: N/A       1. Plantar soft tissue ulceration at the heel pad associated with probable 1 cm x 0.9 cm abscess and surrounding induration and edema.   2. No osseous destruction or erosive changes of the calcaneus to suggest osteomyelitis. MRI  is more sensitive for detecting early changes of osteomyelitis and may be of further diagnostic value if clinical suspicion remains high.   3. Diffuse edema of the soft tissues without evidence of gas, which is similar in degree to 04/06/2025. Correlate for cellulitis versus venous stasis edema or combination thereof.     MACRO: None.   Signed by: Deejay Domingo 4/18/2025 7:38 PM Dictation workstation:   [id number]    XR chest 1 view  Result Date: 4/18/2025  Interpreted By:  Deejay Domingo, STUDY: XR CHEST 1 VIEW;  4/18/2025 7:13 pm   INDICATION: Signs/Symptoms:dyspnea.     COMPARISON: 03/27/2019   ACCESSION NUMBER(S): [id number]   ORDERING CLINICIAN: WENDY ESQUIVEL   FINDINGS:     The cardiomediastinal silhouette and pulmonary vasculature are within normal limits. Compared to 03/27/2019 there is a new nodular opacity in the left upper lobe measuring 3.3 cm x 1.6 cm. The remainder of the lungs are clear aside from minimal bibasilar subsegmental atelectasis.   No pleural effusion or pneumothorax.       New nodular opacity at the left lung apex measuring 3.3 cm x 1.6 cm. It demonstrates a somewhat elongated morphology. Therefore differential diagnosis includes malignancy, atelectasis and pneumonia. Unless clinical picture is consistent with pneumonia, given emphysema and increased risk factors for lung cancer, recommend CT of the chest for further evaluation, the urgency of which may be determined on a clinical basis.   MACRO: None.   Signed by: Deejay Domingo 4/18/2025 7:34 PM Dictation workstation:   [id number]       Assessment and Plan    Right Foot Ulcer with Cellulitis and Possible Abscess  -Podiatry consult.   -CT right foot shows plantar ulcer R heel with probable 1cm x 0.9cm abscess. No signs of OM or gas. Surrounding cellulitis.   -ID consulted as well.   -Currently will place on broad spectrum IV abx - meropenem and vanco (PCN allergy).   -Wound culture from the foot pending.   -Pain control  with NSAIDs, she is not able to take opioids.   -Local wound care per podiatry.   -PT/OT evals.     Acute Hypoxic Respiratory Failure  -Currently on 2L NC, wean as sats allow. Baseline RA.   -Pulmonary consult.   -Likely from underling COPD with what appears to be developing PNA on CT.   -IBDs.     Pneumonia  -CTA chest with no signs of PE. Does shows bibasilar patchy airspace and consolidative opacities- developing pneumonia?  -Currently on broad spectrum IV abx for her foot infection.   -Will obtain strep and legionella.   -Sputum cx if able.   -Pulmonary to see.     COPD Exacerbation  -She is currently very diminished, no wheeze at this time.   -Will give scheduled IBDs and RT consult.   -Defer any need for steroids to pulmonary.     Hx Opioid Abuse and ETOH Abuse  -Currently on suboxone, states she has been taking for 11 years and has been sober.   -OAARS checked to verify dose and frequency of her suboxone, re-ordered home dose.   -NO opioids for pain.     Tobacco Abuse  -Nicotine patch ordered.   -Cessation.     Anxiety / Depression  -Continue her home medication with buspar and cymbalta.     DVT Risk  -Lovenox subcutaneous.   -SCDs.     Plan  Case and plan was discussed with patient, she is agreeable.   Case and plan was also discussed with my collaborating physician.     CODE STATUS: FULL    I spent 75 minutes in the professional and overall care of this patient.      Cyndie Beasley, ANUSHA-CNP         [1]   Past Medical History:  Diagnosis Date    Anxiety and depression     Asthma     COPD (chronic obstructive pulmonary disease) (Multi)     Emphysema lung (Multi)     Emphysema lung (Multi)     IBS (irritable bowel syndrome)     Opioid dependence in remission (Multi)     Tobacco abuse    [2]   Past Surgical History:  Procedure Laterality Date    ANKLE SURGERY Left     COLONOSCOPY  10/26/2017    Complete Colonoscopy    HAND SURGERY Right     OTHER SURGICAL HISTORY      Vocal cord repair s/p throat cut     SKULL FRACTURE ELEVATION N/A    [3]   Family History  Problem Relation Name Age of Onset    Cervical cancer Mother      Lung cancer Father

## 2025-04-19 NOTE — CARE PLAN
Problem: Respiratory  Goal: Clear secretions with interventions this shift  Outcome: Progressing  Goal: Minimize anxiety/maximize coping throughout shift  Outcome: Progressing  Goal: Minimal/no exertional discomfort or dyspnea this shift  Outcome: Progressing  Goal: Wean oxygen to maintain O2 saturation per order/standard this shift  Outcome: Progressing

## 2025-04-19 NOTE — CONSULTS
Inpatient consult to Podiatry  Consult performed by: Jose Manuel Rosenberg DPM  Consult ordered by: ANUSHA Slaughter-CNP          Reason For Consult  Right foot ulcer and pain    History Of Present Illness  Gloria Bhagat is a 59 y.o. female presenting with right foot ulcer and pain.  She says the ulcer itself has been present for about 1 month.  She was seeing a podiatrist for this outside of the hospital.  However she describes for me that over the past few days she has noticed increased and warmth, redness and some drainage coming from the wound which prompted her to seek medical care.  She was not on any antibiotics prior to her admission.  She denies constitutional symptoms for me today.    She also tells me that her right third toe has been little painful.  Slightly convoluted history but does describe that she stepped on a tack at this toe and has had some callus buildup since.  She has not noticed bleeding or drainage coming from this toe.     Past Medical History  She has a past medical history of Anxiety and depression, Asthma, COPD (chronic obstructive pulmonary disease) (Multi), Emphysema lung (Multi), Emphysema lung (Multi), IBS (irritable bowel syndrome), Opioid dependence in remission (Multi), and Tobacco abuse.    Surgical History  She has a past surgical history that includes Colonoscopy (10/26/2017); Skull fracture elevation (N/A); Other surgical history; Hand surgery (Right); and Ankle surgery (Left).     Social History  She reports that she has been smoking cigarettes. She started smoking about 29 years ago. She has a 14.6 pack-year smoking history. She has never used smokeless tobacco. She reports that she does not currently use alcohol. She reports that she does not currently use drugs.    Family History  Family History[1]     Allergies  Penicillin    Review of Systems    REVIEW OF SYSTEMS  GENERAL:  Negative for malaise, significant weight loss, fever  HEENT:  No changes in hearing or  vision, no nose bleeds or other nasal problems and Negative for frequent or significant headaches  NECK:  Negative for lumps, goiter, pain and significant neck swelling  RESPIRATORY:  Negative for cough, wheezing and shortness of breath  CARDIOVASCULAR:  Negative for chest pain, leg swelling and palpitations  GI:  Negative for abdominal discomfort, blood in stools or black stools and change in bowel habits  :  Negative for dysuria, frequency and incontinence  MUSCULOSKELETAL:  Negative for joint pain or swelling, back pain, and muscle pain.  SKIN:  Negative for lesions, rash, and itching  PSYCH:  Negative for sleep disturbance, mood disorder and recent psychosocial stressors  HEMATOLOGY/LYMPHOLOGY:  Negative for prolonged bleeding, bruising easily, and swollen nodes.  ENDOCRINE:  Negative for cold or heat intolerance, polyuria, polydipsia and goiter  NEURO: Negative, denies any burning, tingling or numbness     Objective:   Vasc: DP and PT pulses are palpable bilateral.  CFT is less than 3 seconds bilateral.  Skin temperature is warm to cool proximal to distal bilateral.      Neuro:  Light touch is intact to the foot bilateral.  Protective sensation is intact.    Derm: On the right heel there is a ulceration measuring approximately 2.0 x 2.0 cm.  This extends 0.2 cm deep to subcutaneous and fat layer.  The base is 80% granular, 20% fibrotic.  No necrosis or gangrene.  There is no tunneling, undermining, deep extension beyond subcutaneous and fat layer.  There is no bone exposed.  On my evaluation I was unable to find any tunneling areas which would communicate with an abscess.  She is painful with palpation to this ulceration and the surrounding heel.  There is mild surrounding erythema.  No active drainage.  No bleeding.  No pus.  No ascending cellulitis.    There is a faint callus to the distal tuft of the right third toe.  After debridement no underlying ulceration.      Ortho: Muscle strength is 5/5 for all  "pedal groups tested.  Patient does have all 10 toes.  Pain to the heel on the right.     Physical Exam     Last Recorded Vitals  Blood pressure 113/56, pulse 80, temperature 36.6 °C (97.9 °F), temperature source Temporal, resp. rate 17, height 1.6 m (5' 3\"), weight 86.8 kg (191 lb 6.4 oz), SpO2 97%.    Relevant Results  CT right foot 4/18: Radiologist's interpretation discussing the small ulceration at the plantar heel pad with adjacent edema, but also makes suggestion of likely 1.0 x 0.9 cm fluid collection representing an abscess deep to the ulceration-on my personal read I do not visualize any evidence of abscess.    Labs reviewed    Wound culture 4/18: Pending     Assessment/Plan     1.  Right heel ulceration to fat and subcutaneous layer  2.  Right heel pain  3.  Right foot cellulitis    -Wound inspected today.  Overall appears stable likely with surrounding cellulitis-no evidence of abscess.  -Bandage change order placed.  Change daily.  Medihoney Aquacel and DSD.  -Empiric IV antibiotics for now.  -Okay for WBAT in surgical shoe-order placed.    No plan for surgical intervention per podiatry.  Overall appears to be stable ulceration with surrounding cellulitis.  Bandage change order placed.  Will continue to follow while in house.    I spent 50 minutes in the professional and overall care of this patient.      Jose Manuel Rosenberg DPM         [1]   Family History  Problem Relation Name Age of Onset    Cervical cancer Mother      Lung cancer Father       "

## 2025-04-20 VITALS
OXYGEN SATURATION: 94 % | HEIGHT: 63 IN | RESPIRATION RATE: 18 BRPM | DIASTOLIC BLOOD PRESSURE: 61 MMHG | BODY MASS INDEX: 33.91 KG/M2 | TEMPERATURE: 98.2 F | WEIGHT: 191.4 LBS | HEART RATE: 93 BPM | SYSTOLIC BLOOD PRESSURE: 110 MMHG

## 2025-04-20 LAB
ANION GAP SERPL CALCULATED.3IONS-SCNC: 13 MMOL/L (ref 10–20)
BACTERIA BLD CULT: NORMAL
BACTERIA BLD CULT: NORMAL
BACTERIA SPEC CULT: ABNORMAL
BUN SERPL-MCNC: 17 MG/DL (ref 6–23)
CALCIUM SERPL-MCNC: 8.5 MG/DL (ref 8.6–10.3)
CHLORIDE SERPL-SCNC: 107 MMOL/L (ref 98–107)
CO2 SERPL-SCNC: 23 MMOL/L (ref 21–32)
CREAT SERPL-MCNC: 0.73 MG/DL (ref 0.5–1.05)
EGFRCR SERPLBLD CKD-EPI 2021: >90 ML/MIN/1.73M*2
ERYTHROCYTE [DISTWIDTH] IN BLOOD BY AUTOMATED COUNT: 13.2 % (ref 11.5–14.5)
GLUCOSE SERPL-MCNC: 175 MG/DL (ref 74–99)
GRAM STN SPEC: ABNORMAL
GRAM STN SPEC: ABNORMAL
HCT VFR BLD AUTO: 38.5 % (ref 36–46)
HGB BLD-MCNC: 12.7 G/DL (ref 12–16)
LEGIONELLA AG UR QL: NEGATIVE
MCH RBC QN AUTO: 32.9 PG (ref 26–34)
MCHC RBC AUTO-ENTMCNC: 33 G/DL (ref 32–36)
MCV RBC AUTO: 100 FL (ref 80–100)
NRBC BLD-RTO: 0 /100 WBCS (ref 0–0)
PLATELET # BLD AUTO: 223 X10*3/UL (ref 150–450)
POTASSIUM SERPL-SCNC: 4.3 MMOL/L (ref 3.5–5.3)
RBC # BLD AUTO: 3.86 X10*6/UL (ref 4–5.2)
S PNEUM AG UR QL: NEGATIVE
SODIUM SERPL-SCNC: 139 MMOL/L (ref 136–145)
VANCOMYCIN SERPL-MCNC: 25.5 UG/ML (ref 5–20)
WBC # BLD AUTO: 7.9 X10*3/UL (ref 4.4–11.3)

## 2025-04-20 PROCEDURE — 99232 SBSQ HOSP IP/OBS MODERATE 35: CPT | Performed by: NURSE PRACTITIONER

## 2025-04-20 PROCEDURE — 80048 BASIC METABOLIC PNL TOTAL CA: CPT | Performed by: NURSE PRACTITIONER

## 2025-04-20 PROCEDURE — 85027 COMPLETE CBC AUTOMATED: CPT | Performed by: NURSE PRACTITIONER

## 2025-04-20 PROCEDURE — 2500000005 HC RX 250 GENERAL PHARMACY W/O HCPCS: Performed by: NURSE PRACTITIONER

## 2025-04-20 PROCEDURE — 2500000002 HC RX 250 W HCPCS SELF ADMINISTERED DRUGS (ALT 637 FOR MEDICARE OP, ALT 636 FOR OP/ED): Performed by: INTERNAL MEDICINE

## 2025-04-20 PROCEDURE — 94668 MNPJ CHEST WALL SBSQ: CPT

## 2025-04-20 PROCEDURE — 2500000004 HC RX 250 GENERAL PHARMACY W/ HCPCS (ALT 636 FOR OP/ED): Mod: JZ | Performed by: NURSE PRACTITIONER

## 2025-04-20 PROCEDURE — 80202 ASSAY OF VANCOMYCIN: CPT | Performed by: PHARMACIST

## 2025-04-20 PROCEDURE — S4991 NICOTINE PATCH NONLEGEND: HCPCS | Performed by: NURSE PRACTITIONER

## 2025-04-20 PROCEDURE — 1100000001 HC PRIVATE ROOM DAILY

## 2025-04-20 PROCEDURE — 36415 COLL VENOUS BLD VENIPUNCTURE: CPT | Performed by: NURSE PRACTITIONER

## 2025-04-20 PROCEDURE — 2500000004 HC RX 250 GENERAL PHARMACY W/ HCPCS (ALT 636 FOR OP/ED): Performed by: STUDENT IN AN ORGANIZED HEALTH CARE EDUCATION/TRAINING PROGRAM

## 2025-04-20 PROCEDURE — 2500000002 HC RX 250 W HCPCS SELF ADMINISTERED DRUGS (ALT 637 FOR MEDICARE OP, ALT 636 FOR OP/ED): Performed by: NURSE PRACTITIONER

## 2025-04-20 PROCEDURE — 2500000004 HC RX 250 GENERAL PHARMACY W/ HCPCS (ALT 636 FOR OP/ED): Performed by: PHARMACIST

## 2025-04-20 PROCEDURE — 94640 AIRWAY INHALATION TREATMENT: CPT

## 2025-04-20 PROCEDURE — 2500000001 HC RX 250 WO HCPCS SELF ADMINISTERED DRUGS (ALT 637 FOR MEDICARE OP): Performed by: NURSE PRACTITIONER

## 2025-04-20 RX ADMIN — MEROPENEM AND SODIUM CHLORIDE 1 G: 1 INJECTION, SOLUTION INTRAVENOUS at 02:05

## 2025-04-20 RX ADMIN — PREDNISONE 40 MG: 20 TABLET ORAL at 09:04

## 2025-04-20 RX ADMIN — NICOTINE 1 PATCH: 21 PATCH, EXTENDED RELEASE TRANSDERMAL at 09:04

## 2025-04-20 RX ADMIN — VANCOMYCIN 1000 MG: 1 INJECTION, SOLUTION INTRAVENOUS at 11:08

## 2025-04-20 RX ADMIN — ENOXAPARIN SODIUM 40 MG: 40 INJECTION SUBCUTANEOUS at 09:05

## 2025-04-20 RX ADMIN — GABAPENTIN 300 MG: 300 CAPSULE ORAL at 23:05

## 2025-04-20 RX ADMIN — LIDOCAINE 1 PATCH: 4 PATCH TOPICAL at 09:04

## 2025-04-20 RX ADMIN — Medication 3 L/MIN: at 19:18

## 2025-04-20 RX ADMIN — BUPRENORPHINE AND NALOXONE 1 FILM: 8; 2 FILM BUCCAL; SUBLINGUAL at 09:05

## 2025-04-20 RX ADMIN — IPRATROPIUM BROMIDE AND ALBUTEROL SULFATE 3 ML: 2.5; .5 SOLUTION RESPIRATORY (INHALATION) at 19:15

## 2025-04-20 RX ADMIN — Medication 32 PERCENT: at 07:49

## 2025-04-20 RX ADMIN — FAMOTIDINE 40 MG: 20 TABLET, FILM COATED ORAL at 09:05

## 2025-04-20 RX ADMIN — CETIRIZINE HYDROCHLORIDE 10 MG: 10 TABLET, FILM COATED ORAL at 09:05

## 2025-04-20 RX ADMIN — IPRATROPIUM BROMIDE AND ALBUTEROL SULFATE 3 ML: 2.5; .5 SOLUTION RESPIRATORY (INHALATION) at 07:44

## 2025-04-20 RX ADMIN — Medication 2 SPRAY: at 09:05

## 2025-04-20 RX ADMIN — MEROPENEM AND SODIUM CHLORIDE 1 G: 1 INJECTION, SOLUTION INTRAVENOUS at 10:26

## 2025-04-20 RX ADMIN — BUSPIRONE HYDROCHLORIDE 15 MG: 5 TABLET ORAL at 23:05

## 2025-04-20 RX ADMIN — KETOROLAC TROMETHAMINE 15 MG: 30 INJECTION, SOLUTION INTRAMUSCULAR at 23:05

## 2025-04-20 RX ADMIN — IPRATROPIUM BROMIDE AND ALBUTEROL SULFATE 3 ML: 2.5; .5 SOLUTION RESPIRATORY (INHALATION) at 14:00

## 2025-04-20 RX ADMIN — VANCOMYCIN 1000 MG: 1 INJECTION, SOLUTION INTRAVENOUS at 23:05

## 2025-04-20 RX ADMIN — GABAPENTIN 300 MG: 300 CAPSULE ORAL at 09:05

## 2025-04-20 RX ADMIN — DULOXETINE HYDROCHLORIDE 60 MG: 60 CAPSULE, DELAYED RELEASE ORAL at 23:05

## 2025-04-20 RX ADMIN — MEROPENEM AND SODIUM CHLORIDE 1 G: 1 INJECTION, SOLUTION INTRAVENOUS at 17:19

## 2025-04-20 ASSESSMENT — COGNITIVE AND FUNCTIONAL STATUS - GENERAL
CLIMB 3 TO 5 STEPS WITH RAILING: A LITTLE
WALKING IN HOSPITAL ROOM: A LITTLE
MOBILITY SCORE: 22
DAILY ACTIVITIY SCORE: 24

## 2025-04-20 ASSESSMENT — PAIN SCALES - GENERAL
PAINLEVEL_OUTOF10: 7
PAINLEVEL_OUTOF10: 0 - NO PAIN
PAINLEVEL_OUTOF10: 0 - NO PAIN

## 2025-04-20 ASSESSMENT — PAIN - FUNCTIONAL ASSESSMENT
PAIN_FUNCTIONAL_ASSESSMENT: 0-10
PAIN_FUNCTIONAL_ASSESSMENT: UNABLE TO SELF-REPORT

## 2025-04-20 ASSESSMENT — PAIN DESCRIPTION - LOCATION: LOCATION: FOOT

## 2025-04-20 ASSESSMENT — PAIN DESCRIPTION - ORIENTATION: ORIENTATION: RIGHT

## 2025-04-20 NOTE — PROGRESS NOTES
Gloria Bhagat is a 59 y.o. female on day 1 of admission presenting with Right foot ulcer, with unspecified severity (Multi).      Subjective   Patient seen and examined. Resting in bed. States she is feeling better today, less pain. No SOB at this time. Afebrile.        Objective     Last Recorded Vitals  /64 (BP Location: Right arm, Patient Position: Lying)   Pulse 92   Temp 36.6 °C (97.9 °F) (Temporal)   Resp 20   Wt 86.8 kg (191 lb 6.4 oz)   SpO2 93%   Intake/Output last 3 Shifts:    Intake/Output Summary (Last 24 hours) at 4/20/2025 1045  Last data filed at 4/20/2025 0700  Gross per 24 hour   Intake 1200 ml   Output 1850 ml   Net -650 ml       Admission Weight  Weight: 86.8 kg (191 lb 6.4 oz) (04/19/25 0807)    Daily Weight  04/19/25 : 86.8 kg (191 lb 6.4 oz)    Image Results  CT angio chest for pulmonary embolism  Narrative: Interpreted By:  Catalina Flynn,   STUDY:  CT ANGIO CHEST FOR PULMONARY EMBOLISM;  4/18/2025 9:49 pm      INDICATION:  Signs/Symptoms:dyspnea. hypoxemia, elevated D Dimer.      COMPARISON:  CT scan of the chest 11/14/2019.      ACCESSION NUMBER(S):  [id number]      ORDERING CLINICIAN:  LUIS E VAUGHAN      TECHNIQUE:  Helical data acquisition of the chest was obtained after intravenous  administration of  50 mL of Omnipaque 350. Images were reformatted in  axial, coronal, and sagittal planes. Axial and coronal MIPS were  reconstructed and reviewed.      FINDINGS:  HEART AND VESSELS:  No acute pulmonary embolism to the  segmental arterial level.      Main pulmonary artery and its branches are normal in caliber.      The thoracic aorta is of normal course and caliber  without vascular  calcifications.      No coronary artery calcifications are seen.The study is not optimized  for evaluation of coronary arteries.      The cardiac chambers are not enlarged.      No evidence of pericardial effusion.      MEDIASTINUM AND AMANDA, LOWER NECK AND AXILLA:  The visualized thyroid gland  is within normal limits.      No evidence of thoracic lymphadenopathy by CT criteria.      Esophagus appears within normal limits as seen.      LUNGS AND AIRWAYS:  The trachea and central airways are patent. No endobronchial lesion.      Centrilobular and paraseptal emphysema noted most marked in the lung  apices. There are bibasilar patchy airspace and consolidative  opacities which may relate to atelectasis versus developing airspace  disease. No effusion or pneumothorax.      UPPER ABDOMEN:  The visualized subdiaphragmatic structures demonstrate no remarkable  findings.      CHEST WALL AND OSSEOUS STRUCTURES:  Remote right rib fracture deformities noted. Multilevel degenerative  changes are present.      Impression: 1. No acute pulmonary embolism to the segmental arterial level.  2. Centrilobular and paraseptal emphysema noted.  3. There are bibasilar patchy airspace and consolidative opacities  which may relate to atelectasis versus developing airspace disease.  Correlate clinically and continued radiographic follow-up to  resolution is advised.  4. Additional findings as noted above.          MACRO:  None      Signed by: Catalina Flynn 4/18/2025 10:33 PM  Dictation workstation:   [id number]  CT foot right wo IV contrast  Narrative: Interpreted By:  Deejay Domingo,   STUDY:  CT FOOT RIGHT WO IV CONTRAST;  4/18/2025 7:11 pm      INDICATION:  Signs/Symptoms:r/o calcaneal osteo.          COMPARISON:  CT right foot 04/06/2025      ACCESSION NUMBER(S):  [id number]      ORDERING CLINICIAN:  WENDY ESQUIVEL      TECHNIQUE:  CT imaging of the right foot was obtained without contrast. Coronal  and sagittal reformatted images were performed. 3D reconstructed  images were not performed at time of dictation therefore not used  during interpretation.      FINDINGS:  OSSEOUS STRUCTURES:  There is no osseous destruction or erosive change involving the  calcaneus. There is no acute fracture or malalignment. Joint  spaces  are maintained aside from moderate 1st MTP joint osteoarthrosis.  Plantar calcaneal spur.              SOFT TISSUES:  Diffuse edema of the soft tissues. No soft tissue gas. There is a  small ulceration at the plantar heel pad with adjacent induration and  edema. There is likely a 1 cm x 0.9 cm fluid collection representing  an abscess just deep to the ulceration.              OTHER:  N/A      Impression: 1. Plantar soft tissue ulceration at the heel pad associated with  probable 1 cm x 0.9 cm abscess and surrounding induration and edema.      2. No osseous destruction or erosive changes of the calcaneus to  suggest osteomyelitis. MRI is more sensitive for detecting early  changes of osteomyelitis and may be of further diagnostic value if  clinical suspicion remains high.      3. Diffuse edema of the soft tissues without evidence of gas, which  is similar in degree to 04/06/2025. Correlate for cellulitis versus  venous stasis edema or combination thereof.          MACRO:  None.      Signed by: Deejay Domingo 4/18/2025 7:38 PM  Dictation workstation:   [id number]  XR chest 1 view  Narrative: Interpreted By:  Deejay Domingo,   STUDY:  XR CHEST 1 VIEW;  4/18/2025 7:13 pm      INDICATION:  Signs/Symptoms:dyspnea.          COMPARISON:  03/27/2019      ACCESSION NUMBER(S):  [id number]      ORDERING CLINICIAN:  WENDY ESQUIVEL      FINDINGS:          The cardiomediastinal silhouette and pulmonary vasculature are within  normal limits. Compared to 03/27/2019 there is a new nodular opacity  in the left upper lobe measuring 3.3 cm x 1.6 cm. The remainder of  the lungs are clear aside from minimal bibasilar subsegmental  atelectasis.      No pleural effusion or pneumothorax.      Impression: New nodular opacity at the left lung apex measuring 3.3 cm x 1.6 cm.  It demonstrates a somewhat elongated morphology. Therefore  differential diagnosis includes malignancy, atelectasis and  pneumonia. Unless clinical  picture is consistent with pneumonia,  given emphysema and increased risk factors for lung cancer, recommend  CT of the chest for further evaluation, the urgency of which may be  determined on a clinical basis.      MACRO:  None.      Signed by: Deejay Domingo 4/18/2025 7:34 PM  Dictation workstation:   [id number]      Physical Exam    General: Alert and oriented x3, pleasant.   Cardiac: Regular rate and rhythm, S1/S2 , no murmur.   Pulmonary: Diminished on 2L NC.   Abdomen: Soft, round, nontender. BS +x4.   Extremities: Trace BLE edema.   Skin: No rashes or lesions.  R foot ulceration covered with DSD, no drainage seen on dressing.     Relevant Results    Scheduled medications  Scheduled Medications[1]  Continuous medications  Continuous Medications[2]  PRN medications  PRN Medications[3]     Results for orders placed or performed during the hospital encounter of 04/19/25 (from the past 24 hours)   CBC   Result Value Ref Range    WBC 7.9 4.4 - 11.3 x10*3/uL    nRBC 0.0 0.0 - 0.0 /100 WBCs    RBC 3.86 (L) 4.00 - 5.20 x10*6/uL    Hemoglobin 12.7 12.0 - 16.0 g/dL    Hematocrit 38.5 36.0 - 46.0 %     80 - 100 fL    MCH 32.9 26.0 - 34.0 pg    MCHC 33.0 32.0 - 36.0 g/dL    RDW 13.2 11.5 - 14.5 %    Platelets 223 150 - 450 x10*3/uL   Basic metabolic panel   Result Value Ref Range    Glucose 175 (H) 74 - 99 mg/dL    Sodium 139 136 - 145 mmol/L    Potassium 4.3 3.5 - 5.3 mmol/L    Chloride 107 98 - 107 mmol/L    Bicarbonate 23 21 - 32 mmol/L    Anion Gap 13 10 - 20 mmol/L    Urea Nitrogen 17 6 - 23 mg/dL    Creatinine 0.73 0.50 - 1.05 mg/dL    eGFR >90 >60 mL/min/1.73m*2    Calcium 8.5 (L) 8.6 - 10.3 mg/dL   Vancomycin   Result Value Ref Range    Vancomycin 25.5 (H) 5.0 - 20.0 ug/mL            Assessment and Plan     Right Foot Ulcer with Cellulitis and Possible Abscess  -ID and Podiatry follows.   -CT right foot shows plantar ulcer R heel with probable 1cm x 0.9cm abscess. No signs of OM or gas. Surrounding  cellulitis. Per podiatry do not feel like there is abscess collection.   -Continue broad spectrum IV abx - meropenem and vanco (PCN allergy).   -Wound culture from the foot pending, so far with mixed organisms.   -Pain control with NSAIDs, she is not able to take opioids.   -Local wound care per podiatry.   -PT/OT evals.   -WBAT with surgical shoe per podiatry.      Acute Hypoxic Respiratory Failure  -Currently on 2L NC, wean as sats allow. Baseline RA.   -Pulmonary follows.   -Likely from underling COPD with what appears to be developing PNA on CT.   -IBDs.      Pneumonia  -CTA chest with no signs of PE. Does shows bibasilar patchy airspace and consolidative opacities- developing pneumonia?  -Currently on broad spectrum IV abx for her foot infection that will cover.   -Strep and legionella pending.   -Sputum cx if able.   -Pulmonary follows.      COPD Exacerbation  -She is currently very diminished, no wheeze at this time.   -Continue scheduled IBDs and RT consult.   -Pulmonary started on PO prednisone.      Hx Opioid Abuse and ETOH Abuse  -Currently on suboxone, states she has been taking for 11 years and has been sober.   -OAARS checked to verify dose and frequency of her suboxone, re-ordered home dose.   -NO opioids for pain.      Tobacco Abuse  -Nicotine patch ordered.   -Cessation.      Anxiety / Depression  -Continue her home medication with buspar and cymbalta.      DVT Risk  -Lovenox subcutaneous.   -SCDs.      Plan  ID, Podiatry and Pulmonary follows.   Continue broad spectrum IV abx.   Cultures pending.   Continue PO prednisone.   PT/OT evals.        ANUSHA Slaughter-CNP           [1] buprenorphine-naloxone, 1 Film, sublingual, q12h  busPIRone, 15 mg, oral, Nightly  cetirizine, 10 mg, oral, Daily  DULoxetine, 60 mg, oral, Nightly  enoxaparin, 40 mg, subcutaneous, q24h  famotidine, 40 mg, oral, Daily  fluticasone, 2 spray, Each Nostril, Daily  gabapentin, 300 mg, oral, BID  ipratropium-albuteroL, 3  mL, nebulization, TID  lidocaine, 1 patch, transdermal, Daily  meropenem, 1 g, intravenous, q8h  nicotine, 1 patch, transdermal, Daily  oxygen, , inhalation, Continuous - Inhalation  predniSONE, 40 mg, oral, Daily  vancomycin, 1,000 mg, intravenous, q12h  [2]    [3] PRN medications: acetaminophen, albuterol, ketorolac, ondansetron ODT **OR** ondansetron, vancomycin

## 2025-04-20 NOTE — PROGRESS NOTES
Vancomycin Dosing by Pharmacy- FOLLOW UP    Gloria Bhagat is a 59 y.o. year old female who Pharmacy has been consulted for vancomycin dosing for cellulitis, skin and soft tissue. Based on the patient's indication and renal status this patient is being dosed based on a goal AUC of 400-600.     Renal function is currently stable.    Current vancomycin dose: 1000 mg given every 12 hours    Estimated vancomycin AUC on current dose: 600 mg/L.hr     Visit Vitals  /61 (BP Location: Right arm, Patient Position: Lying)   Pulse 81   Temp 36.4 °C (97.5 °F) (Temporal)   Resp 19        Lab Results   Component Value Date    CREATININE 0.73 2025    CREATININE 0.90 2025    CREATININE 0.89 2021    CREATININE 0.82 2018    CREATININE 0.76 2017        Patient weight is as follows:   Vitals:    25 0807   Weight: 86.8 kg (191 lb 6.4 oz)       Cultures:  No results found for the encounter in last 14 days.       I/O last 3 completed shifts:  In: 1600 (18.4 mL/kg) [P.O.:1050; IV Piggyback:550]  Out: 1900 (21.9 mL/kg) [Urine:1900 (0.6 mL/kg/hr)]  Weight: 86.8 kg   I/O during current shift:  No intake/output data recorded.    Temp (24hrs), Av.2 °C (97.2 °F), Min:36 °C (96.8 °F), Max:36.5 °C (97.7 °F)      Assessment/Plan    Within goal AUC range. Continue current vancomycin regimen.    This dosing regimen is predicted by [employer] to result in the following pharmacokinetic parameters:    Regimen: 1000 mg IV every 12 hours.  Start time: 11:30 on 2025  Exposure target: AUC24 (range)400-600 mg/L.hr   IRA63-28: 581 mg/L.hr  AUC24,ss: 600 mg/L.hr  Probability of AUC24 > 400: 99 %  Ctrough,ss: 18.4 mg/L  Probability of Ctrough,ss > 20: 38 %      The next level will be obtained on  at 0500. May be obtained sooner if clinically indicated.   Will continue to monitor renal function daily while on vancomycin and order serum creatinine at least every 48 hours if not already ordered.  Follow for  continued vancomycin needs, clinical response, and signs/symptoms of toxicity.       Jez Lipscomb, PharmD

## 2025-04-21 VITALS
SYSTOLIC BLOOD PRESSURE: 127 MMHG | BODY MASS INDEX: 33.91 KG/M2 | HEART RATE: 83 BPM | OXYGEN SATURATION: 95 % | RESPIRATION RATE: 16 BRPM | HEIGHT: 63 IN | TEMPERATURE: 97.2 F | WEIGHT: 191.4 LBS | DIASTOLIC BLOOD PRESSURE: 73 MMHG

## 2025-04-21 LAB
ANION GAP SERPL CALCULATED.3IONS-SCNC: 11 MMOL/L (ref 10–20)
BACTERIA SPEC CULT: ABNORMAL
BUN SERPL-MCNC: 22 MG/DL (ref 6–23)
CALCIUM SERPL-MCNC: 8.3 MG/DL (ref 8.6–10.3)
CHLORIDE SERPL-SCNC: 109 MMOL/L (ref 98–107)
CO2 SERPL-SCNC: 25 MMOL/L (ref 21–32)
CREAT SERPL-MCNC: 0.75 MG/DL (ref 0.5–1.05)
EGFRCR SERPLBLD CKD-EPI 2021: >90 ML/MIN/1.73M*2
ERYTHROCYTE [DISTWIDTH] IN BLOOD BY AUTOMATED COUNT: 13.4 % (ref 11.5–14.5)
GLUCOSE SERPL-MCNC: 186 MG/DL (ref 74–99)
GRAM STAIN: ABNORMAL
GRAM STN SPEC: ABNORMAL
GRAM STN SPEC: ABNORMAL
HCT VFR BLD AUTO: 34.9 % (ref 36–46)
HGB BLD-MCNC: 11.2 G/DL (ref 12–16)
MCH RBC QN AUTO: 32.3 PG (ref 26–34)
MCHC RBC AUTO-ENTMCNC: 32.1 G/DL (ref 32–36)
MCV RBC AUTO: 101 FL (ref 80–100)
NRBC BLD-RTO: 0 /100 WBCS (ref 0–0)
PLATELET # BLD AUTO: 196 X10*3/UL (ref 150–450)
POTASSIUM SERPL-SCNC: 3.8 MMOL/L (ref 3.5–5.3)
RBC # BLD AUTO: 3.47 X10*6/UL (ref 4–5.2)
SODIUM SERPL-SCNC: 141 MMOL/L (ref 136–145)
VANCOMYCIN SERPL-MCNC: 27.1 UG/ML (ref 5–20)
WBC # BLD AUTO: 9.4 X10*3/UL (ref 4.4–11.3)

## 2025-04-21 PROCEDURE — 2500000004 HC RX 250 GENERAL PHARMACY W/ HCPCS (ALT 636 FOR OP/ED): Performed by: NURSE PRACTITIONER

## 2025-04-21 PROCEDURE — 80048 BASIC METABOLIC PNL TOTAL CA: CPT | Performed by: NURSE PRACTITIONER

## 2025-04-21 PROCEDURE — 2500000002 HC RX 250 W HCPCS SELF ADMINISTERED DRUGS (ALT 637 FOR MEDICARE OP, ALT 636 FOR OP/ED): Performed by: NURSE PRACTITIONER

## 2025-04-21 PROCEDURE — 2500000001 HC RX 250 WO HCPCS SELF ADMINISTERED DRUGS (ALT 637 FOR MEDICARE OP): Performed by: NURSE PRACTITIONER

## 2025-04-21 PROCEDURE — 2500000002 HC RX 250 W HCPCS SELF ADMINISTERED DRUGS (ALT 637 FOR MEDICARE OP, ALT 636 FOR OP/ED): Performed by: INTERNAL MEDICINE

## 2025-04-21 PROCEDURE — 80202 ASSAY OF VANCOMYCIN: CPT | Performed by: PHARMACIST

## 2025-04-21 PROCEDURE — 2500000005 HC RX 250 GENERAL PHARMACY W/O HCPCS: Performed by: NURSE PRACTITIONER

## 2025-04-21 PROCEDURE — 99238 HOSP IP/OBS DSCHRG MGMT 30/<: CPT | Performed by: NURSE PRACTITIONER

## 2025-04-21 PROCEDURE — 85027 COMPLETE CBC AUTOMATED: CPT | Performed by: NURSE PRACTITIONER

## 2025-04-21 PROCEDURE — 2500000002 HC RX 250 W HCPCS SELF ADMINISTERED DRUGS (ALT 637 FOR MEDICARE OP, ALT 636 FOR OP/ED): Performed by: REGISTERED NURSE

## 2025-04-21 PROCEDURE — 9420000001 HC RT PATIENT EDUCATION 5 MIN

## 2025-04-21 PROCEDURE — 36415 COLL VENOUS BLD VENIPUNCTURE: CPT | Performed by: NURSE PRACTITIONER

## 2025-04-21 PROCEDURE — 2500000004 HC RX 250 GENERAL PHARMACY W/ HCPCS (ALT 636 FOR OP/ED): Performed by: STUDENT IN AN ORGANIZED HEALTH CARE EDUCATION/TRAINING PROGRAM

## 2025-04-21 PROCEDURE — S4991 NICOTINE PATCH NONLEGEND: HCPCS | Performed by: NURSE PRACTITIONER

## 2025-04-21 PROCEDURE — 99232 SBSQ HOSP IP/OBS MODERATE 35: CPT | Performed by: NURSE PRACTITIONER

## 2025-04-21 PROCEDURE — 94640 AIRWAY INHALATION TREATMENT: CPT

## 2025-04-21 PROCEDURE — 94668 MNPJ CHEST WALL SBSQ: CPT

## 2025-04-21 RX ORDER — BUDESONIDE 0.5 MG/2ML
0.5 INHALANT ORAL
Status: DISCONTINUED | OUTPATIENT
Start: 2025-04-21 | End: 2025-04-21 | Stop reason: HOSPADM

## 2025-04-21 RX ORDER — BUPRENORPHINE AND NALOXONE 8; 2 MG/1; MG/1
1 FILM, SOLUBLE BUCCAL; SUBLINGUAL
Status: DISCONTINUED | OUTPATIENT
Start: 2025-04-21 | End: 2025-04-21 | Stop reason: HOSPADM

## 2025-04-21 RX ORDER — TIOTROPIUM BROMIDE INHALATION SPRAY 3.12 UG/1
2 SPRAY, METERED RESPIRATORY (INHALATION) DAILY
Start: 2025-04-21

## 2025-04-21 RX ORDER — TIOTROPIUM BROMIDE 18 UG/1
1 CAPSULE ORAL; RESPIRATORY (INHALATION)
Status: ON HOLD | COMMUNITY
End: 2025-04-21 | Stop reason: ENTERED-IN-ERROR

## 2025-04-21 RX ORDER — VANCOMYCIN 1.25 G/250ML
1750 INJECTION, SOLUTION INTRAVENOUS EVERY 24 HOURS
Status: DISCONTINUED | OUTPATIENT
Start: 2025-04-21 | End: 2025-04-21 | Stop reason: HOSPADM

## 2025-04-21 RX ORDER — CEFUROXIME AXETIL 500 MG/1
500 TABLET ORAL 2 TIMES DAILY
Qty: 24 TABLET | Refills: 0 | Status: SHIPPED | OUTPATIENT
Start: 2025-04-21 | End: 2025-05-03

## 2025-04-21 RX ORDER — DOXYCYCLINE 100 MG/1
100 CAPSULE ORAL 2 TIMES DAILY
Qty: 24 CAPSULE | Refills: 0 | Status: SHIPPED | OUTPATIENT
Start: 2025-04-21 | End: 2025-05-03

## 2025-04-21 RX ORDER — PREDNISONE 10 MG/1
20 TABLET ORAL 2 TIMES DAILY
Qty: 35 TABLET | Refills: 0 | Status: SHIPPED | OUTPATIENT
Start: 2025-04-21

## 2025-04-21 RX ADMIN — Medication 21 PERCENT: at 20:00

## 2025-04-21 RX ADMIN — NICOTINE 1 PATCH: 21 PATCH, EXTENDED RELEASE TRANSDERMAL at 08:05

## 2025-04-21 RX ADMIN — MEROPENEM AND SODIUM CHLORIDE 1 G: 1 INJECTION, SOLUTION INTRAVENOUS at 18:05

## 2025-04-21 RX ADMIN — BUPRENORPHINE AND NALOXONE 1 FILM: 8; 2 FILM BUCCAL; SUBLINGUAL at 18:05

## 2025-04-21 RX ADMIN — GABAPENTIN 300 MG: 300 CAPSULE ORAL at 08:06

## 2025-04-21 RX ADMIN — BUPRENORPHINE AND NALOXONE 1 FILM: 8; 2 FILM BUCCAL; SUBLINGUAL at 08:05

## 2025-04-21 RX ADMIN — PREDNISONE 40 MG: 20 TABLET ORAL at 08:06

## 2025-04-21 RX ADMIN — BUDESONIDE 0.5 MG: 0.5 INHALANT ORAL at 12:00

## 2025-04-21 RX ADMIN — IPRATROPIUM BROMIDE AND ALBUTEROL SULFATE 3 ML: 2.5; .5 SOLUTION RESPIRATORY (INHALATION) at 19:57

## 2025-04-21 RX ADMIN — Medication 2 SPRAY: at 08:16

## 2025-04-21 RX ADMIN — ENOXAPARIN SODIUM 40 MG: 40 INJECTION SUBCUTANEOUS at 08:05

## 2025-04-21 RX ADMIN — ACETAMINOPHEN 650 MG: 325 TABLET, FILM COATED ORAL at 08:15

## 2025-04-21 RX ADMIN — MEROPENEM AND SODIUM CHLORIDE 1 G: 1 INJECTION, SOLUTION INTRAVENOUS at 10:15

## 2025-04-21 RX ADMIN — BUDESONIDE 0.5 MG: 0.5 INHALANT ORAL at 19:57

## 2025-04-21 RX ADMIN — CETIRIZINE HYDROCHLORIDE 10 MG: 10 TABLET, FILM COATED ORAL at 08:06

## 2025-04-21 RX ADMIN — IPRATROPIUM BROMIDE AND ALBUTEROL SULFATE 3 ML: 2.5; .5 SOLUTION RESPIRATORY (INHALATION) at 07:26

## 2025-04-21 RX ADMIN — Medication 2 L/MIN: at 07:28

## 2025-04-21 RX ADMIN — MEROPENEM AND SODIUM CHLORIDE 1 G: 1 INJECTION, SOLUTION INTRAVENOUS at 02:22

## 2025-04-21 RX ADMIN — ACETAMINOPHEN 650 MG: 325 TABLET, FILM COATED ORAL at 18:05

## 2025-04-21 RX ADMIN — FAMOTIDINE 40 MG: 20 TABLET, FILM COATED ORAL at 08:06

## 2025-04-21 RX ADMIN — IPRATROPIUM BROMIDE AND ALBUTEROL SULFATE 3 ML: 2.5; .5 SOLUTION RESPIRATORY (INHALATION) at 12:00

## 2025-04-21 SDOH — HEALTH STABILITY: PHYSICAL HEALTH
HOW OFTEN DO YOU NEED TO HAVE SOMEONE HELP YOU WHEN YOU READ INSTRUCTIONS, PAMPHLETS, OR OTHER WRITTEN MATERIAL FROM YOUR DOCTOR OR PHARMACY?: NEVER

## 2025-04-21 SDOH — ECONOMIC STABILITY: HOUSING INSECURITY: IN THE PAST 12 MONTHS, HOW MANY TIMES HAVE YOU MOVED WHERE YOU WERE LIVING?: 0

## 2025-04-21 SDOH — ECONOMIC STABILITY: FOOD INSECURITY: WITHIN THE PAST 12 MONTHS, YOU WORRIED THAT YOUR FOOD WOULD RUN OUT BEFORE YOU GOT THE MONEY TO BUY MORE.: NEVER TRUE

## 2025-04-21 SDOH — SOCIAL STABILITY: SOCIAL INSECURITY: WITHIN THE LAST YEAR, HAVE YOU BEEN HUMILIATED OR EMOTIONALLY ABUSED IN OTHER WAYS BY YOUR PARTNER OR EX-PARTNER?: NO

## 2025-04-21 SDOH — ECONOMIC STABILITY: INCOME INSECURITY: IN THE PAST 12 MONTHS HAS THE ELECTRIC, GAS, OIL, OR WATER COMPANY THREATENED TO SHUT OFF SERVICES IN YOUR HOME?: NO

## 2025-04-21 SDOH — ECONOMIC STABILITY: HOUSING INSECURITY: IN THE LAST 12 MONTHS, WAS THERE A TIME WHEN YOU WERE NOT ABLE TO PAY THE MORTGAGE OR RENT ON TIME?: NO

## 2025-04-21 SDOH — ECONOMIC STABILITY: FOOD INSECURITY: WITHIN THE PAST 12 MONTHS, THE FOOD YOU BOUGHT JUST DIDN'T LAST AND YOU DIDN'T HAVE MONEY TO GET MORE.: NEVER TRUE

## 2025-04-21 SDOH — ECONOMIC STABILITY: FOOD INSECURITY: HOW HARD IS IT FOR YOU TO PAY FOR THE VERY BASICS LIKE FOOD, HOUSING, MEDICAL CARE, AND HEATING?: NOT VERY HARD

## 2025-04-21 SDOH — ECONOMIC STABILITY: HOUSING INSECURITY: AT ANY TIME IN THE PAST 12 MONTHS, WERE YOU HOMELESS OR LIVING IN A SHELTER (INCLUDING NOW)?: NO

## 2025-04-21 SDOH — SOCIAL STABILITY: SOCIAL INSECURITY: WITHIN THE LAST YEAR, HAVE YOU BEEN AFRAID OF YOUR PARTNER OR EX-PARTNER?: NO

## 2025-04-21 SDOH — ECONOMIC STABILITY: TRANSPORTATION INSECURITY: IN THE PAST 12 MONTHS, HAS LACK OF TRANSPORTATION KEPT YOU FROM MEDICAL APPOINTMENTS OR FROM GETTING MEDICATIONS?: NO

## 2025-04-21 ASSESSMENT — COGNITIVE AND FUNCTIONAL STATUS - GENERAL
PERSONAL GROOMING: A LITTLE
DAILY ACTIVITIY SCORE: 18
WALKING IN HOSPITAL ROOM: A LITTLE
MOBILITY SCORE: 22
DRESSING REGULAR LOWER BODY CLOTHING: A LITTLE
TOILETING: A LITTLE
EATING MEALS: A LITTLE
DAILY ACTIVITIY SCORE: 24
CLIMB 3 TO 5 STEPS WITH RAILING: A LITTLE
DRESSING REGULAR UPPER BODY CLOTHING: A LITTLE
HELP NEEDED FOR BATHING: A LITTLE

## 2025-04-21 ASSESSMENT — PAIN SCALES - GENERAL
PAINLEVEL_OUTOF10: 0 - NO PAIN
PAINLEVEL_OUTOF10: 2
PAINLEVEL_OUTOF10: 2

## 2025-04-21 ASSESSMENT — ACTIVITIES OF DAILY LIVING (ADL)
BATHING_LEVEL_OF_ASSISTANCE: CLOSE SUPERVISION
LACK_OF_TRANSPORTATION: NO
BATHING_WHERE_ASSESSED: STANDING SINKSIDE
LACK_OF_TRANSPORTATION: NO
HOME_MANAGEMENT_TIME_ENTRY: 40
BATHING_COMMENTS: PERI AREA

## 2025-04-21 ASSESSMENT — PAIN DESCRIPTION - ORIENTATION
ORIENTATION: RIGHT
ORIENTATION: RIGHT

## 2025-04-21 ASSESSMENT — PAIN DESCRIPTION - LOCATION
LOCATION: FOOT
LOCATION: HEAD

## 2025-04-21 ASSESSMENT — PAIN - FUNCTIONAL ASSESSMENT
PAIN_FUNCTIONAL_ASSESSMENT: 0-10

## 2025-04-21 NOTE — PROGRESS NOTES
Gloria Bhagat is a 59 y.o. female on day 2 of admission presenting with Right foot ulcer, with unspecified severity (Multi).      Subjective   Patient seen and examined. Resting in bed. NO new c/o. Podiatry cleared patient for discharge. Wean O2 as able, discussed with bedside nurse.      Objective     Last Recorded Vitals  /61 (BP Location: Right arm, Patient Position: Lying)   Pulse 79   Temp 36.3 °C (97.3 °F) (Temporal)   Resp 18   Wt 86.8 kg (191 lb 6.4 oz)   SpO2 96%   Intake/Output last 3 Shifts:    Intake/Output Summary (Last 24 hours) at 4/21/2025 0928  Last data filed at 4/21/2025 0600  Gross per 24 hour   Intake 1050 ml   Output 300 ml   Net 750 ml     Physical Exam     General: Alert and oriented x3, pleasant.   Cardiac: Regular rate and rhythm, S1/S2 , no murmur.   Pulmonary: Diminished on 1L NC.   Abdomen: Soft, round, nontender. BS +x4.   Extremities: Trace BLE edema.   Skin: No rashes or lesions.  R foot ulceration covered with DSD, no drainage seen on dressing.      Admission Weight  Weight: 86.8 kg (191 lb 6.4 oz) (04/19/25 0807)    Daily Weight  04/19/25 : 86.8 kg (191 lb 6.4 oz)    Image Results  CT angio chest for pulmonary embolism  Narrative: Interpreted By:  Catalina Flynn,   STUDY:  CT ANGIO CHEST FOR PULMONARY EMBOLISM;  4/18/2025 9:49 pm      INDICATION:  Signs/Symptoms:dyspnea. hypoxemia, elevated D Dimer.      COMPARISON:  CT scan of the chest 11/14/2019.      ACCESSION NUMBER(S):  [id number]      ORDERING CLINICIAN:  LUIS E VAUGHAN      TECHNIQUE:  Helical data acquisition of the chest was obtained after intravenous  administration of  50 mL of Omnipaque 350. Images were reformatted in  axial, coronal, and sagittal planes. Axial and coronal MIPS were  reconstructed and reviewed.      FINDINGS:  HEART AND VESSELS:  No acute pulmonary embolism to the  segmental arterial level.      Main pulmonary artery and its branches are normal in caliber.      The thoracic aorta is  of normal course and caliber  without vascular  calcifications.      No coronary artery calcifications are seen.The study is not optimized  for evaluation of coronary arteries.      The cardiac chambers are not enlarged.      No evidence of pericardial effusion.      MEDIASTINUM AND AMANDA, LOWER NECK AND AXILLA:  The visualized thyroid gland is within normal limits.      No evidence of thoracic lymphadenopathy by CT criteria.      Esophagus appears within normal limits as seen.      LUNGS AND AIRWAYS:  The trachea and central airways are patent. No endobronchial lesion.      Centrilobular and paraseptal emphysema noted most marked in the lung  apices. There are bibasilar patchy airspace and consolidative  opacities which may relate to atelectasis versus developing airspace  disease. No effusion or pneumothorax.      UPPER ABDOMEN:  The visualized subdiaphragmatic structures demonstrate no remarkable  findings.      CHEST WALL AND OSSEOUS STRUCTURES:  Remote right rib fracture deformities noted. Multilevel degenerative  changes are present.      Impression: 1. No acute pulmonary embolism to the segmental arterial level.  2. Centrilobular and paraseptal emphysema noted.  3. There are bibasilar patchy airspace and consolidative opacities  which may relate to atelectasis versus developing airspace disease.  Correlate clinically and continued radiographic follow-up to  resolution is advised.  4. Additional findings as noted above.          MACRO:  None      Signed by: Catalina Flynn 4/18/2025 10:33 PM  Dictation workstation:   [id number]  CT foot right wo IV contrast  Narrative: Interpreted By:  Deejay Domingo,   STUDY:  CT FOOT RIGHT WO IV CONTRAST;  4/18/2025 7:11 pm      INDICATION:  Signs/Symptoms:r/o calcaneal osteo.          COMPARISON:  CT right foot 04/06/2025      ACCESSION NUMBER(S):  [id number]      ORDERING CLINICIAN:  WENDY ESQUIVEL      TECHNIQUE:  CT imaging of the right foot was obtained without  contrast. Coronal  and sagittal reformatted images were performed. 3D reconstructed  images were not performed at time of dictation therefore not used  during interpretation.      FINDINGS:  OSSEOUS STRUCTURES:  There is no osseous destruction or erosive change involving the  calcaneus. There is no acute fracture or malalignment. Joint spaces  are maintained aside from moderate 1st MTP joint osteoarthrosis.  Plantar calcaneal spur.              SOFT TISSUES:  Diffuse edema of the soft tissues. No soft tissue gas. There is a  small ulceration at the plantar heel pad with adjacent induration and  edema. There is likely a 1 cm x 0.9 cm fluid collection representing  an abscess just deep to the ulceration.              OTHER:  N/A      Impression: 1. Plantar soft tissue ulceration at the heel pad associated with  probable 1 cm x 0.9 cm abscess and surrounding induration and edema.      2. No osseous destruction or erosive changes of the calcaneus to  suggest osteomyelitis. MRI is more sensitive for detecting early  changes of osteomyelitis and may be of further diagnostic value if  clinical suspicion remains high.      3. Diffuse edema of the soft tissues without evidence of gas, which  is similar in degree to 04/06/2025. Correlate for cellulitis versus  venous stasis edema or combination thereof.          MACRO:  None.      Signed by: Deejay Domingo 4/18/2025 7:38 PM  Dictation workstation:   [id number]  XR chest 1 view  Narrative: Interpreted By:  Deejay Domingo,   STUDY:  XR CHEST 1 VIEW;  4/18/2025 7:13 pm      INDICATION:  Signs/Symptoms:dyspnea.          COMPARISON:  03/27/2019      ACCESSION NUMBER(S):  [id number]      ORDERING CLINICIAN:  WENDY ESQUIVEL      FINDINGS:          The cardiomediastinal silhouette and pulmonary vasculature are within  normal limits. Compared to 03/27/2019 there is a new nodular opacity  in the left upper lobe measuring 3.3 cm x 1.6 cm. The remainder of  the lungs are clear  aside from minimal bibasilar subsegmental  atelectasis.      No pleural effusion or pneumothorax.      Impression: New nodular opacity at the left lung apex measuring 3.3 cm x 1.6 cm.  It demonstrates a somewhat elongated morphology. Therefore  differential diagnosis includes malignancy, atelectasis and  pneumonia. Unless clinical picture is consistent with pneumonia,  given emphysema and increased risk factors for lung cancer, recommend  CT of the chest for further evaluation, the urgency of which may be  determined on a clinical basis.      MACRO:  None.      Signed by: Deejay Domingo 4/18/2025 7:34 PM  Dictation workstation:   [id number]        Scheduled medications  Scheduled Medications[1]  Continuous medications  Continuous Medications[2]  PRN medications  PRN Medications[3]     Results for orders placed or performed during the hospital encounter of 04/19/25 (from the past 24 hours)   Vancomycin   Result Value Ref Range    Vancomycin 27.1 (H) 5.0 - 20.0 ug/mL   CBC   Result Value Ref Range    WBC 9.4 4.4 - 11.3 x10*3/uL    nRBC 0.0 0.0 - 0.0 /100 WBCs    RBC 3.47 (L) 4.00 - 5.20 x10*6/uL    Hemoglobin 11.2 (L) 12.0 - 16.0 g/dL    Hematocrit 34.9 (L) 36.0 - 46.0 %     (H) 80 - 100 fL    MCH 32.3 26.0 - 34.0 pg    MCHC 32.1 32.0 - 36.0 g/dL    RDW 13.4 11.5 - 14.5 %    Platelets 196 150 - 450 x10*3/uL   Basic Metabolic Panel   Result Value Ref Range    Glucose 186 (H) 74 - 99 mg/dL    Sodium 141 136 - 145 mmol/L    Potassium 3.8 3.5 - 5.3 mmol/L    Chloride 109 (H) 98 - 107 mmol/L    Bicarbonate 25 21 - 32 mmol/L    Anion Gap 11 10 - 20 mmol/L    Urea Nitrogen 22 6 - 23 mg/dL    Creatinine 0.75 0.50 - 1.05 mg/dL    eGFR >90 >60 mL/min/1.73m*2    Calcium 8.3 (L) 8.6 - 10.3 mg/dL            Assessment and Plan     Right Foot Ulcer with Cellulitis and Possible Abscess  -ID and Podiatry follows.   -CT right foot shows plantar ulcer R heel with probable 1cm x 0.9cm abscess. No signs of OM or gas.  Surrounding cellulitis. Per podiatry do not feel like there is abscess collection.   -Continue broad spectrum IV abx - meropenem and vanco (PCN allergy).   -Wound culture from the foot pending, so far with mixed organisms.   -Pain control with NSAIDs, she is not able to take opioids.   -Local wound care per podiatry.   -PT/OT evals.   -WBAT with surgical shoe per podiatry.      Acute Hypoxic Respiratory Failure  -Currently on 1L NC, wean as sats allow. Baseline RA.   -Pulmonary follows.   -Likely from underling COPD with what appears to be developing PNA on CT.   -IBDs.      Pneumonia  -CTA chest with no signs of PE. Does shows bibasilar patchy airspace and consolidative opacities- developing pneumonia?  -Currently on broad spectrum IV abx for her foot infection that will cover.   -Strep and legionella pending.   -Sputum cx if able.   -Pulmonary follows.      COPD Exacerbation  -She is currently very diminished, no wheeze at this time.   -Continue scheduled IBDs and RT consult.   -Pulmonary started on PO prednisone.      Hx Opioid Abuse and ETOH Abuse  -Currently on suboxone, states she has been taking for 11 years and has been sober.   -OAARS checked to verify dose and frequency of her suboxone, re-ordered home dose.   -NO opioids for pain.      Tobacco Abuse  -Nicotine patch ordered.   -Cessation.      Anxiety / Depression  -Continue her home medication with buspar and cymbalta.      DVT Risk  -Lovenox subcutaneous.   -SCDs.      Plan  ID, Podiatry and Pulmonary follows.   Continue broad spectrum IV abx.   Cultures pending.   Continue PO prednisone.   PT/OT evals.   Podiatry cleared patient for discharge.   Will need final recommendation from ID  Anticipated discharge soon, possible today if able to wean off O2.     Tyrell Lynn, APRN-CNP           [1] budesonide, 0.5 mg, nebulization, BID  buprenorphine-naloxone, 1 Film, sublingual, BID  busPIRone, 15 mg, oral, Nightly  cetirizine, 10 mg, oral, Daily  DULoxetine,  60 mg, oral, Nightly  enoxaparin, 40 mg, subcutaneous, q24h  famotidine, 40 mg, oral, Daily  fluticasone, 2 spray, Each Nostril, Daily  gabapentin, 300 mg, oral, BID  ipratropium-albuteroL, 3 mL, nebulization, TID  lidocaine, 1 patch, transdermal, Daily  meropenem, 1 g, intravenous, q8h  nicotine, 1 patch, transdermal, Daily  oxygen, , inhalation, Continuous - Inhalation  predniSONE, 40 mg, oral, Daily  vancomycin, 1,750 mg, intravenous, q24h     [2]    [3] PRN medications: acetaminophen, albuterol, ondansetron ODT **OR** ondansetron, vancomycin

## 2025-04-21 NOTE — PROGRESS NOTES
Occupational Therapy    OT Treatment    Patient Name: Gloria Bhagat  MRN: [id number]  Department: [address]  Room: [facility]438-A  Today's Date: 4/21/2025  Time Calculation  Start Time: 1435  Stop Time: 1515  Time Calculation (min): 40 min        Assessment:  OT Assessment: pt is making good progress with goals, puts forth good effort during session. Needs moderate safety cues during ADLs, continue to recommend OT services  Prognosis: Good  Barriers to Discharge Home: Caregiver assistance, Physical needs  Caregiver Assistance: Patient lives alone and/or does not have reliable caregiver assistance  Physical Needs: Intermittent mobility assistance needed, Stair navigation to access bath limited by function/safety, Stair navigation to access bed limited by function/safety, Stair navigation into home limited by function/safety, Intermittent ADL assistance needed  Evaluation/Treatment Tolerance: Patient tolerated treatment well  End of Session Communication: Bedside nurse  End of Session Patient Position: Up in chair, Alarm on (all needs in reach)  OT Assessment Results: Decreased ADL status, Decreased upper extremity strength, Decreased safe judgment during ADL, Decreased endurance, Decreased functional mobility, Decreased IADLs  Prognosis: Good  Evaluation/Treatment Tolerance: Patient tolerated treatment well  Strengths: Premorbid level of function  Barriers to Participation: Comorbidities  Plan:  Treatment Interventions: ADL retraining, Functional transfer training, UE strengthening/ROM, Endurance training, Patient/family training, Equipment evaluation/education, Neuromuscular reeducation, Compensatory technique education  OT Frequency: 3 times per week  OT Discharge Recommendations: Low intensity level of continued care  Equipment Recommended upon Discharge: Wheeled walker  OT Recommended Transfer Status: Assist of 1  OT - OK to Discharge: Yes  Treatment Interventions: ADL retraining, Functional transfer training, UE  strengthening/ROM, Endurance training, Patient/family training, Equipment evaluation/education, Neuromuscular reeducation, Compensatory technique education    Subjective   Previous Visit Info:  OT Last Visit  OT Received On: 04/21/25  General:  General  Reason for Referral: impaired mobility, RT foot ulcer  Referred By: Cyndie CANSECO  Past Medical History Relevant to Rehab: anxiety, depression, COPD, emphysema, asthma, IBS, tobacco abuse, Lt ankle sx, vocal cord repair, skull fx, h/o ETOH and methamphetamine abuse  General Comment: Agreeable to treatment. reluctant to let therapist in bathroom with her.  Precautions:  Hearing/Visual Limitations: vision and hearing WFL  LE Weight Bearing Status: Weight Bearing as Tolerated  Medical Precautions: Fall precautions, Oxygen therapy device and L/min (1L nc)  Precautions Comment: RLE WBAT in post op shoe     Date/Time Vitals Session Patient Position Pulse Resp SpO2 BP MAP (mmHg)    04/21/25 1524 --  --  105  17  93 %  121/63  82                 Pain:  Pain Assessment  Pain Assessment: 0-10  0-10 (Numeric) Pain Score: 0 - No pain    Objective    Cognition:  Cognition  Overall Cognitive Status: Within Functional Limits  Following Commands: Follows multistep commands without difficulty  Impulsive: Mildly  Processing Speed: Within funtional limits  Coordination:  Upper Body Coordination: WFL  Lower Body Coordination: RLE decreased slightly  Coordination Comment: Fair  Activities of Daily Living: Grooming  Grooming Level of Assistance: Close supervision  Grooming Where Assessed: Standing sinkside  Grooming Comments: oral care, shaving chin, washing face    UE Bathing  UE Bathing Level of Assistance: Close supervision  UE Bathing Where Assessed: Standing sinkside  UE Bathing Comments: sponge bathing    LE Bathing  LE Bathing Level of Assistance: Close supervision  LE Bathing Where Assessed: Standing sinkside  LE Bathing Comments: inocencio area    UE Dressing  UE Dressing  Level of Assistance: Close supervision  UE Dressing Where Assessed: Other (Comment) (standing sinkside)  UE Dressing Comments: don/doff hospital gown.  cues for safety while standing at sink    LE Dressing  LE Dressing: No    Toileting  Toileting Level of Assistance: Close supervision  Where Assessed: Toilet  Toileting Comments: hygiene only  Functional Standing Tolerance:  Time: ~15min  Activity: self care  Functional Standing Tolerance Comments: unsteady at times, use of one UE on counter for support.  Bed Mobility/Transfers: Bed Mobility 1  Bed Mobility 1: Supine to sitting  Level of Assistance 1: Distant supervision    Transfers 2  Transfer From 2: Stand to  Transfer to 2: Chair with arms  Transfer Device 2: Walker  Transfer Level of Assistance 2: Close supervision    Toilet Transfers  Toilet Transfer From: Bed  Toilet Transfer Type: To  Toilet Transfer to: Standard toilet  Toilet Transfer Technique: Ambulating  Toilet Transfers: Supervision  Toilet Transfers Comments: cues for safe hand placement    Functional Mobility:  Functional Mobility 1  Surface 1: Level tile  Device 1: Rolling walker  Assistance 1: Close supervision  Quality of Functional Mobility 1:  (unsteady, additional effort needed)  Comments 1: short household distance in room      Outcome Measures:[facility] Daily Activity  Putting on and taking off regular lower body clothing: A little  Bathing (including washing, rinsing, drying): A little  Putting on and taking off regular upper body clothing: A little  Toileting, which includes using toilet, bedpan or urinal: A little  Taking care of personal grooming such as brushing teeth: A little  Eating Meals: A little  Daily Activity - Total Score: 18        Education Documentation  ADL Training, taught by RITA Yang at 4/21/2025  3:36 PM.  Learner: Patient  Readiness: Acceptance  Method: Explanation  Response: Verbalizes Understanding, Demonstrated Understanding  Comment: safe transfer training,  safety during bathing and dressing tasks    Education Comments  No comments found.           Goals:  Encounter Problems       Encounter Problems (Active)       OT Goals       ADLS (Progressing)       Start:  04/19/25    Expected End:  05/03/25       Patient will complete ADL tasks, with modified independence using AE as needed in order to increase patient's safety and independence with self-care tasks.           Functional Transfers (Progressing)       Start:  04/19/25    Expected End:  05/03/25       Patient will complete functional transfers with modified independence using least restrictive device, in order to increase patient's safety and independence with daily tasks.           Activity Tolerance (Progressing)       Start:  04/19/25    Expected End:  05/03/25       Patient will demonstrate the ability to participate in functional activity at least >/= 20 minutes in order to increase patient's safety and independence with daily tasks.                RITA RO/L [id number]

## 2025-04-21 NOTE — DISCHARGE SUMMARY
"Discharge Diagnosis  Right foot ulcer, with unspecified severity (Multi)           Issues Requiring Follow-Up  Follow up with pulmonology, ID, and podiatry     Discharge Meds     Medication List      START taking these medications     cefuroxime 500 mg tablet; Commonly known as: Ceftin; Take 1 tablet (500   mg) by mouth 2 times a day for 12 days.   doxycycline 100 mg capsule; Commonly known as: Vibramycin; Take 1   capsule (100 mg) by mouth 2 times a day for 12 days. Take with at least 8   ounces (large glass) of water, do not lie down for 30 minutes after   predniSONE 10 mg tablet; Commonly known as: Deltasone; Take 2 tablets   (20 mg) by mouth 2 times a day. Take 2 tablet twice a day for 5 days, then   1 tablet twice a day for 5 day, then 1 tablet daily for 5 days then stop.   Spiriva Respimat 2.5 mcg/actuation inhaler; Generic drug: tiotropium;   Inhale 2 puffs once daily. Continue with your home inhaler     CONTINUE taking these medications     albuterol 90 mcg/actuation inhaler   buprenorphine-naloxone 8-2 mg SL film; Commonly known as: Suboxone   busPIRone 15 mg tablet; Commonly known as: Buspar   cyanocobalamin (vitamin B-12) 2,500 mcg tablet, sublingual SL tablet   DULoxetine 20 mg DR capsule; Commonly known as: Cymbalta   ergocalciferol 1250 mcg (50,000 units) capsule; Commonly known as:   Vitamin D-2   famotidine 40 mg tablet; Commonly known as: Pepcid   fluticasone 50 mcg/actuation nasal spray; Commonly known as: Flonase   gabapentin 300 mg capsule; Commonly known as: Neurontin   guaiFENesin 1,200 mg tablet extended release 12hr; Commonly known as:   Mucinex   lidocaine 5 % patch; Commonly known as: Lidoderm   loratadine 10 mg tablet; Commonly known as: Claritin   magnesium 200 mg tablet     STOP taking these medications     ibuprofen 800 mg tablet     ASK your doctor about these medications     silver-hydrocolloid dressing 1.2-2 X 2 %-\" bandage; Apply 1 each   topically once daily for 10 days. Consult " your wound care team for   additional dressing needs in the future.; Ask about: Should I take this   medication?       Test Results Pending At Discharge  Pending Labs       No current pending labs.            Hospital Course   Gloria Bhagat is a 59 y.o. female presented from [place] ED for complaints of right foot pain, warmth, redness, foul odor from her heel wound with some SOB. She states she has been dealing with the right foot wound for about 1 month now. She was following with podiatry outpatient, denies any recent use of abx. She admits to increased warmth, redness and drainage with increased pain from the wound over the past few days with subjective chills and fevers prompting her to go to the ED. She does admit to upper respiratory complaints of cough and nasal congestion. On arrival to the ED she was found to be hypoxic and wheezing, she was placed on supplemental O2 and given breathing treatment with some relief. She denies any use of home oxygen. She denies any chest pain, abd pain, nausea, vomiting, diarrhea, constipation or urinary symptoms.    Patient was seen by pulmonology, ID, and podiatry. Patient wants to leave home today. ID recommending Doxy and Ceftin for discharge. Pulmonology recommending steroid and OP follow up for lung nodule evaluation/repeated CT chest. Patient will be discharged to home on recommended ATB.   Cleared by pulmonology as patient is on RA, and by ID.   Pertinent Physical Exam At Time of Discharge  Physical Exam  Vitals reviewed.   Constitutional:       Appearance: She is ill-appearing.   HENT:      Head: Normocephalic.      Nose: Nose normal.      Mouth/Throat:      Mouth: Mucous membranes are moist.   Eyes:      Extraocular Movements: Extraocular movements intact.   Cardiovascular:      Rate and Rhythm: Regular rhythm.   Pulmonary:      Effort: Pulmonary effort is normal.   Abdominal:      General: Bowel sounds are normal.   Musculoskeletal:         General: Normal  range of motion.      Cervical back: Neck supple.   Skin:     Findings: Lesion present.   Neurological:      Mental Status: She is alert and oriented to person, place, and time.         Outpatient Follow-Up  No future appointments.      Tyrell Lynn, ANUSHA-CNP

## 2025-04-21 NOTE — PROGRESS NOTES
Gloria Bhagat is a 59 y.o. female on day 2 of admission presenting with Right foot ulcer, with unspecified severity (Multi).    Subjective   Feeling well this morning.  Says that her right foot pain has dramatically decreased and it feels much better.  Denies constitutional symptoms.  Bandage have been changed per nursing.       Physical Exam    Objective     REVIEW OF SYSTEMS  GENERAL:  Negative for malaise, significant weight loss, fever  HEENT:  No changes in hearing or vision, no nose bleeds or other nasal problems and Negative for frequent or significant headaches  NECK:  Negative for lumps, goiter, pain and significant neck swelling  RESPIRATORY:  Negative for cough, wheezing and shortness of breath  CARDIOVASCULAR:  Negative for chest pain, leg swelling and palpitations  GI:  Negative for abdominal discomfort, blood in stools or black stools and change in bowel habits  :  Negative for dysuria, frequency and incontinence  MUSCULOSKELETAL:  Negative for joint pain or swelling, back pain, and muscle pain.  SKIN:  Negative for lesions, rash, and itching  PSYCH:  Negative for sleep disturbance, mood disorder and recent psychosocial stressors  HEMATOLOGY/LYMPHOLOGY:  Negative for prolonged bleeding, bruising easily, and swollen nodes.  ENDOCRINE:  Negative for cold or heat intolerance, polyuria, polydipsia and goiter  NEURO: Negative, denies any burning, tingling or numbness      Objective:   Vasc: DP and PT pulses are palpable bilateral.  CFT is less than 3 seconds bilateral.  Skin temperature is warm to cool proximal to distal bilateral.       Neuro:  Light touch is intact to the foot bilateral.  Protective sensation is intact.     Derm: On the right heel there is a ulceration measuring approximately 2.0 x 2.0 cm.  This extends 0.2 cm deep to subcutaneous and fat layer.  The base is 80% granular, 20% fibrotic.  No necrosis or gangrene.  There is no tunneling, undermining, deep extension beyond subcutaneous  "and fat layer.  There is no bone exposed.  On my evaluation I was unable to find any tunneling areas which would communicate with an abscess.  No pain today.  There is mild surrounding erythema.  No active drainage.  No bleeding.  No pus.  No ascending cellulitis.     There is a faint callus to the distal tuft of the right third toe.  After debridement no underlying ulceration.        Ortho: Muscle strength is 5/5 for all pedal groups tested.  Patient does have all 10 toes.  Pain to the heel on the right.    Last Recorded Vitals  Blood pressure 111/61, pulse 79, temperature 36.3 °C (97.3 °F), temperature source Temporal, resp. rate 18, height 1.6 m (5' 3\"), weight 86.8 kg (191 lb 6.4 oz), SpO2 96%.    Intake/Output last 3 Shifts:  I/O last 3 completed shifts:  In: 1700 (19.6 mL/kg) [P.O.:900; IV Piggyback:800]  Out: 1800 (20.7 mL/kg) [Urine:1800 (0.6 mL/kg/hr)]  Weight: 86.8 kg     Relevant Results       Labs reviewed.  Imaging reviewed.  Wound culture 4/18: Growing skin ev    Assessment & Plan  Right foot ulcer, with unspecified severity (Multi)    COPD exacerbation (Multi)    Pneumonia of both lower lobes due to infectious organism    1.  Right heel ulceration to fat and subcutaneous layer  2.  Right heel pain  3.  Right foot cellulitis    No evidence of infection.  Chronic stable ulceration.  Pain improved.  Continue daily bandage changes.  Okay for discharge per podiatry when medically stable.  Will follow-up with previously established podiatrist outpatient.    Jose Manuel Rosenberg DPM    I spent 30 minutes in the professional and overall care of this patient.  "

## 2025-04-21 NOTE — PROGRESS NOTES
Gloria Bhagat is a 59 y.o. female on day 2 of admission presenting with Right foot ulcer, with unspecified severity (Multi).    Subjective   Interval History:   Feeling better  On room air saturating 93%  Afebrile, no chills  Interval evaluation by podiatry -note reviewed  Wound culture pending   Denies pain       Objective   Range of Vitals (last 24 hours)  Heart Rate:  []   Temp:  [36.3 °C (97.3 °F)-37.2 °C (99 °F)]   Resp:  [17-19]   BP: (100-144)/(58-76)   SpO2:  [92 %-97 %]   Daily Weight  04/19/25 : 86.8 kg (191 lb 6.4 oz)    Body mass index is 33.9 kg/m².    Physical Exam  Constitutional:       Appearance: Normal appearance.   HENT:      Head: Normocephalic and atraumatic.      Nose: Nose normal.   Eyes:      General: No scleral icterus.     Extraocular Movements: Extraocular movements intact.      Conjunctiva/sclera: Conjunctivae normal.   Cardiovascular:      Rate and Rhythm: Regular rhythm.      Heart sounds: Normal heart sounds.   Pulmonary:      Breath sounds: Decreased breath sounds present.   Abdominal:      General: Bowel sounds are normal.      Palpations: Abdomen is soft.      Tenderness: There is no abdominal tenderness.   Musculoskeletal:      Cervical back: Normal range of motion and neck supple.      Right lower leg: No edema.      Left lower leg: No edema.   Feet:      Comments: Right foot dressing  Skin:     General: Skin is warm and dry.   Neurological:      Mental Status: She is alert.   Psychiatric:         Behavior: Behavior normal. Behavior is cooperative.        Antibiotics  meropenem - 1 gram/50 mL  vancomycin - 1.75 gram/350 mL    Relevant Results  Labs  Results from last 72 hours   Lab Units 04/21/25  0403 04/20/25  0349 04/18/25  1926   WBC AUTO x10*3/uL 9.4 7.9 8.3   HEMOGLOBIN g/dL 11.2* 12.7 12.8   HEMATOCRIT % 34.9* 38.5 38.6   PLATELETS AUTO x10*3/uL 196 223 235   NEUTROS PCT AUTO %  --   --  65.2   LYMPHS PCT AUTO %  --   --  21.2   MONOS PCT AUTO %  --   --  9.7  "  EOS PCT AUTO %  --   --  3.1     Results from last 72 hours   Lab Units 04/21/25  0403 04/20/25  0349 04/18/25  1932   SODIUM mmol/L 141 139 139   POTASSIUM mmol/L 3.8 4.3 3.6   CHLORIDE mmol/L 109* 107 104   CO2 mmol/L 25 23 28   BUN mg/dL 22 17 19   CREATININE mg/dL 0.75 0.73 0.90   GLUCOSE mg/dL 186* 175* 113*   CALCIUM mg/dL 8.3* 8.5* 9.1   ANION GAP mmol/L 11 13 11   EGFR mL/min/1.73m*2 >90 >90 74     Results from last 72 hours   Lab Units 04/18/25  1932   ALK PHOS U/L 51   BILIRUBIN TOTAL mg/dL 0.4   PROTEIN TOTAL g/dL 6.6   ALT U/L 12   AST U/L 16   ALBUMIN g/dL 4.1     Estimated Creatinine Clearance: 85.4 mL/min (by C-G formula based on SCr of 0.75 mg/dL).  No results found for: \"CRP\"  Microbiology  Wound culture pending       Imaging  ECG 12 lead  Result Date: 4/21/2025  Normal sinus rhythm Normal ECG When compared with ECG of 23-JUN-2021 08:39, No significant change was found    CT angio chest for pulmonary embolism  Result Date: 4/18/2025  Interpreted By:  Catalina Flynn, STUDY: CT ANGIO CHEST FOR PULMONARY EMBOLISM;  4/18/2025 9:49 pm   INDICATION: Signs/Symptoms:dyspnea. hypoxemia, elevated D Dimer.   COMPARISON: CT scan of the chest 11/14/2019.   ACCESSION NUMBER(S): [id number]   ORDERING CLINICIAN: LUIS E VAUGHAN   TECHNIQUE: Helical data acquisition of the chest was obtained after intravenous administration of  50 mL of Omnipaque 350. Images were reformatted in axial, coronal, and sagittal planes. Axial and coronal MIPS were reconstructed and reviewed.   FINDINGS: HEART AND VESSELS: No acute pulmonary embolism to the  segmental arterial level.   Main pulmonary artery and its branches are normal in caliber.   The thoracic aorta is of normal course and caliber  without vascular calcifications.   No coronary artery calcifications are seen.The study is not optimized for evaluation of coronary arteries.   The cardiac chambers are not enlarged.   No evidence of pericardial effusion.   MEDIASTINUM AND " AMANDA, LOWER NECK AND AXILLA: The visualized thyroid gland is within normal limits.   No evidence of thoracic lymphadenopathy by CT criteria.   Esophagus appears within normal limits as seen.   LUNGS AND AIRWAYS: The trachea and central airways are patent. No endobronchial lesion.   Centrilobular and paraseptal emphysema noted most marked in the lung apices. There are bibasilar patchy airspace and consolidative opacities which may relate to atelectasis versus developing airspace disease. No effusion or pneumothorax.   UPPER ABDOMEN: The visualized subdiaphragmatic structures demonstrate no remarkable findings.   CHEST WALL AND OSSEOUS STRUCTURES: Remote right rib fracture deformities noted. Multilevel degenerative changes are present.       1. No acute pulmonary embolism to the segmental arterial level. 2. Centrilobular and paraseptal emphysema noted. 3. There are bibasilar patchy airspace and consolidative opacities which may relate to atelectasis versus developing airspace disease. Correlate clinically and continued radiographic follow-up to resolution is advised. 4. Additional findings as noted above.     MACRO: None   Signed by: Catalina Flynn 4/18/2025 10:33 PM Dictation workstation:   [id number]    CT foot right wo IV contrast  Result Date: 4/18/2025  Interpreted By:  Deejay Domingo, STUDY: CT FOOT RIGHT WO IV CONTRAST;  4/18/2025 7:11 pm   INDICATION: Signs/Symptoms:r/o calcaneal osteo.     COMPARISON: CT right foot 04/06/2025   ACCESSION NUMBER(S): [id number]   ORDERING CLINICIAN: WENDY ESQUIVEL   TECHNIQUE: CT imaging of the right foot was obtained without contrast. Coronal and sagittal reformatted images were performed. 3D reconstructed images were not performed at time of dictation therefore not used during interpretation.   FINDINGS: OSSEOUS STRUCTURES: There is no osseous destruction or erosive change involving the calcaneus. There is no acute fracture or malalignment. Joint spaces are maintained  aside from moderate 1st MTP joint osteoarthrosis. Plantar calcaneal spur.       SOFT TISSUES: Diffuse edema of the soft tissues. No soft tissue gas. There is a small ulceration at the plantar heel pad with adjacent induration and edema. There is likely a 1 cm x 0.9 cm fluid collection representing an abscess just deep to the ulceration.       OTHER: N/A       1. Plantar soft tissue ulceration at the heel pad associated with probable 1 cm x 0.9 cm abscess and surrounding induration and edema.   2. No osseous destruction or erosive changes of the calcaneus to suggest osteomyelitis. MRI is more sensitive for detecting early changes of osteomyelitis and may be of further diagnostic value if clinical suspicion remains high.   3. Diffuse edema of the soft tissues without evidence of gas, which is similar in degree to 04/06/2025. Correlate for cellulitis versus venous stasis edema or combination thereof.     MACRO: None.   Signed by: Deejay Domingo 4/18/2025 7:38 PM Dictation workstation:   [id number]    XR chest 1 view  Result Date: 4/18/2025  Interpreted By:  Deejay Domingo, STUDY: XR CHEST 1 VIEW;  4/18/2025 7:13 pm   INDICATION: Signs/Symptoms:dyspnea.     COMPARISON: 03/27/2019   ACCESSION NUMBER(S): [id number]   ORDERING CLINICIAN: WENDY ESQUIVEL   FINDINGS:     The cardiomediastinal silhouette and pulmonary vasculature are within normal limits. Compared to 03/27/2019 there is a new nodular opacity in the left upper lobe measuring 3.3 cm x 1.6 cm. The remainder of the lungs are clear aside from minimal bibasilar subsegmental atelectasis.   No pleural effusion or pneumothorax.       New nodular opacity at the left lung apex measuring 3.3 cm x 1.6 cm. It demonstrates a somewhat elongated morphology. Therefore differential diagnosis includes malignancy, atelectasis and pneumonia. Unless clinical picture is consistent with pneumonia, given emphysema and increased risk factors for lung cancer, recommend CT of  the chest for further evaluation, the urgency of which may be determined on a clinical basis.   MACRO: None.   Signed by: Deejay Domingo 4/18/2025 7:34 PM Dictation workstation:   [id number]    CT foot right wo IV contrast  Result Date: 4/6/2025  Interpreted By:  Cornel Batista, STUDY: CT FOOT RIGHT WO IV CONTRAST;  4/6/2025 4:50 pm   INDICATION: Signs/Symptoms:pain, r/o osteomyelitis.   COMPARISON: None.   ACCESSION NUMBER(S): [id number]   ORDERING CLINICIAN: WENDY ESQUIVEL   TECHNIQUE: CT imaging of the  right foot and ankle was obtained  without administration of intravenous contrast medium. Coronal and sagittal reformatted images were performed.   FINDINGS: OSSEOUS STRUCTURES: No acute fracture. No dislocation. No osseous erosive change. No lytic or blastic osseous lesions. Remote posttraumatic changes of the anterior calcaneal process.   SOFT TISSUES: Diffuse subcutaneous soft tissue edema and skin thickening. No evidence of a focal fluid collection within the limits of CT.       Diffuse subcutaneous soft tissue edema may reflect cellulitis, venous stasis, lymphedema, or fluid overload.   No osseous erosive change to suggest osteomyelitis. If there is persistent clinical concern, consider MRI.   MACRO: None   Signed by: Cornel Batista 4/6/2025 5:46 PM Dictation workstation:   [id number]            Assessment/Plan   Acute hypoxic respiratory failure COPD versus pneumonia, Negative legionella/strep, MRSA  Infected right foot ulcer-culture pending Mixed Gram positive and Gram negative bacteria   Allergy to penicillin-has tolerated Rocephin as cephalosporins     Continue vancomycin-monitor levels  Continue meropenem  Follow-up wound culture  Local care  Offloading  Sputum culture if able  Supportive care  Monitor temperature and WBC    Long term plan transfer to po cefuroxime and doxycyline till 5/3/2025-if wound culture are pending or remain the same at discharge     ANUSHA Chamorro-CNP

## 2025-04-21 NOTE — PROGRESS NOTES
Vancomycin Dosing by Pharmacy- FOLLOW UP    Gloria Bhagat is a 59 y.o. year old female who Pharmacy has been consulted for vancomycin dosing for cellulitis, skin and soft tissue. Based on the patient's indication and renal status this patient is being dosed based on a goal AUC of 400-600.     Renal function is currently stable.    Current vancomycin dose: 1000 mg given every 12 hours    Estimated vancomycin AUC on current dose: 642 mg/L.hr     Visit Vitals  /61 (BP Location: Right arm, Patient Position: Lying)   Pulse 79   Temp 36.3 °C (97.3 °F) (Temporal)   Resp 18        Lab Results   Component Value Date    CREATININE 0.75 2025    CREATININE 0.73 2025    CREATININE 0.90 2025    CREATININE 0.89 2021    CREATININE 0.82 2018    CREATININE 0.76 2017        Patient weight is as follows:   Vitals:    25 0807   Weight: 86.8 kg (191 lb 6.4 oz)       Cultures:  No results found for the encounter in last 14 days.       I/O last 3 completed shifts:  In: 1700 (19.6 mL/kg) [P.O.:900; IV Piggyback:800]  Out: 1800 (20.7 mL/kg) [Urine:1800 (0.6 mL/kg/hr)]  Weight: 86.8 kg   I/O during current shift:  No intake/output data recorded.    Temp (24hrs), Av.7 °C (98 °F), Min:36.3 °C (97.3 °F), Max:37.2 °C (99 °F)      Assessment/Plan    Above goal AUC. Orders placed for new vancomcyin regimen of 1750 every 24 hours to begin at 2300.    This dosing regimen is predicted by [employer] to result in the following pharmacokinetic parameters:  Loading dose: N/A  Regimen: 1750 mg IV every 24 hours.  Start time: 23:05 on 2025  Exposure target: AUC24 (range)400-600 mg/L.hr   [id number]: 550 mg/L.hr  AUC24,ss: 561 mg/L.hr  Probability of AUC24 > 400: 99 %  Ctrough,ss: 13.9 mg/L  Probability of Ctrough,ss > 20: 8 %      The next level will be obtained on  at 0500. May be obtained sooner if clinically indicated.   Will continue to monitor renal function daily while on vancomycin and  order serum creatinine at least every 48 hours if not already ordered.  Follow for continued vancomycin needs, clinical response, and signs/symptoms of toxicity.       Susana Sellers, PharmD

## 2025-04-21 NOTE — PROGRESS NOTES
"Gloria Bhagat is a 59 y.o. female on day 2 of admission seen in follow-up for    Subjective   On room air; oxygen sats 93%. Endorses improved breathing. Denies pain. Afebrile.     Objective     Physical Exam  Vitals and nursing note reviewed.   Constitutional:       Appearance: She is obese.   HENT:      Head: Normocephalic and atraumatic.      Nose: Nose normal.      Mouth/Throat:      Mouth: Mucous membranes are moist.   Eyes:      Extraocular Movements: Extraocular movements intact.      Conjunctiva/sclera: Conjunctivae normal.      Pupils: Pupils are equal, round, and reactive to light.   Cardiovascular:      Rate and Rhythm: Normal rate and regular rhythm.      Pulses: Normal pulses.      Heart sounds: Normal heart sounds.   Pulmonary:      Effort: Pulmonary effort is normal.      Breath sounds: Normal breath sounds.   Abdominal:      General: Bowel sounds are normal.      Palpations: Abdomen is soft.   Musculoskeletal:         General: Normal range of motion.   Skin:     General: Skin is warm and dry.      Capillary Refill: Capillary refill takes less than 2 seconds.   Neurological:      General: No focal deficit present.      Mental Status: She is alert and oriented to person, place, and time.   Psychiatric:         Mood and Affect: Mood normal.         Behavior: Behavior normal.         Last Recorded Vitals  Blood pressure 111/61, pulse 79, temperature 36.3 °C (97.3 °F), temperature source Temporal, resp. rate 18, height 1.6 m (5' 3\"), weight 86.8 kg (191 lb 6.4 oz), SpO2 96%.      Intake/Output Summary (Last 24 hours) at 4/21/2025 1101  Last data filed at 4/21/2025 0600  Gross per 24 hour   Intake 1000 ml   Output 300 ml   Net 700 ml      Scheduled medications:  budesonide, 0.5 mg, nebulization, BID  buprenorphine-naloxone, 1 Film, sublingual, BID  busPIRone, 15 mg, oral, Nightly  cetirizine, 10 mg, oral, Daily  DULoxetine, 60 mg, oral, Nightly  enoxaparin, 40 mg, subcutaneous, q24h  famotidine, 40 mg, " oral, Daily  fluticasone, 2 spray, Each Nostril, Daily  gabapentin, 300 mg, oral, BID  ipratropium-albuteroL, 3 mL, nebulization, TID  lidocaine, 1 patch, transdermal, Daily  meropenem, 1 g, intravenous, q8h  nicotine, 1 patch, transdermal, Daily  oxygen, , inhalation, Continuous - Inhalation  predniSONE, 40 mg, oral, Daily  vancomycin, 1,750 mg, intravenous, q24h    PRN medications: acetaminophen, albuterol, ondansetron ODT **OR** ondansetron, vancomycin      Relevant Results  Results for orders placed or performed during the hospital encounter of 04/19/25 (from the past 24 hours)   Vancomycin   Result Value Ref Range    Vancomycin 27.1 (H) 5.0 - 20.0 ug/mL   CBC   Result Value Ref Range    WBC 9.4 4.4 - 11.3 x10*3/uL    nRBC 0.0 0.0 - 0.0 /100 WBCs    RBC 3.47 (L) 4.00 - 5.20 x10*6/uL    Hemoglobin 11.2 (L) 12.0 - 16.0 g/dL    Hematocrit 34.9 (L) 36.0 - 46.0 %     (H) 80 - 100 fL    MCH 32.3 26.0 - 34.0 pg    MCHC 32.1 32.0 - 36.0 g/dL    RDW 13.4 11.5 - 14.5 %    Platelets 196 150 - 450 x10*3/uL   Basic Metabolic Panel   Result Value Ref Range    Glucose 186 (H) 74 - 99 mg/dL    Sodium 141 136 - 145 mmol/L    Potassium 3.8 3.5 - 5.3 mmol/L    Chloride 109 (H) 98 - 107 mmol/L    Bicarbonate 25 21 - 32 mmol/L    Anion Gap 11 10 - 20 mmol/L    Urea Nitrogen 22 6 - 23 mg/dL    Creatinine 0.75 0.50 - 1.05 mg/dL    eGFR >90 >60 mL/min/1.73m*2    Calcium 8.3 (L) 8.6 - 10.3 mg/dL      CT angio chest for pulmonary embolism  Result Date: 4/18/2025  FINDINGS: HEART AND VESSELS: No acute pulmonary embolism to the  segmental arterial level.   Main pulmonary artery and its branches are normal in caliber.   The thoracic aorta is of normal course and caliber  without vascular calcifications.   No coronary artery calcifications are seen.The study is not optimized for evaluation of coronary arteries.   The cardiac chambers are not enlarged.   No evidence of pericardial effusion.   MEDIASTINUM AND AMANDA, LOWER NECK AND  AXILLA: The visualized thyroid gland is within normal limits.   No evidence of thoracic lymphadenopathy by CT criteria.   Esophagus appears within normal limits as seen.   LUNGS AND AIRWAYS: The trachea and central airways are patent. No endobronchial lesion.   Centrilobular and paraseptal emphysema noted most marked in the lung apices. There are bibasilar patchy airspace and consolidative opacities which may relate to atelectasis versus developing airspace disease. No effusion or pneumothorax.   UPPER ABDOMEN: The visualized subdiaphragmatic structures demonstrate no remarkable findings.   CHEST WALL AND OSSEOUS STRUCTURES: Remote right rib fracture deformities noted. Multilevel degenerative changes are present.  1. No acute pulmonary embolism to the segmental arterial level. 2. Centrilobular and paraseptal emphysema noted. 3. There are bibasilar patchy airspace and consolidative opacities which may relate to atelectasis versus developing airspace disease. Correlate clinically and continued radiographic follow-up to resolution is advised. 4. Additional findings as noted above.         CT foot right wo IV contrast  Result Date: 4/18/2025  FINDINGS: OSSEOUS STRUCTURES: There is no osseous destruction or erosive change involving the calcaneus. There is no acute fracture or malalignment. Joint spaces are maintained aside from moderate 1st MTP joint osteoarthrosis. Plantar calcaneal spur.       SOFT TISSUES: Diffuse edema of the soft tissues. No soft tissue gas. There is a small ulceration at the plantar heel pad with adjacent induration and edema. There is likely a 1 cm x 0.9 cm fluid collection representing an abscess just deep to the ulceration.  1. Plantar soft tissue ulceration at the heel pad associated with probable 1 cm x 0.9 cm abscess and surrounding induration and edema.   2. No osseous destruction or erosive changes of the calcaneus to suggest osteomyelitis. MRI is more sensitive for detecting early changes  of osteomyelitis and may be of further diagnostic value if clinical suspicion remains high.   3. Diffuse edema of the soft tissues without evidence of gas, which is similar in degree to 04/06/2025. Correlate for cellulitis versus venous stasis edema or combination thereof.        XR chest 1 view  Result Date: 4/18/2025   FINDINGS:     The cardiomediastinal silhouette and pulmonary vasculature are within normal limits. Compared to 03/27/2019 there is a new nodular opacity in the left upper lobe measuring 3.3 cm x 1.6 cm. The remainder of the lungs are clear aside from minimal bibasilar subsegmental atelectasis.   No pleural effusion or pneumothorax.  New nodular opacity at the left lung apex measuring 3.3 cm x 1.6 cm. It demonstrates a somewhat elongated morphology. Therefore differential diagnosis includes malignancy, atelectasis and pneumonia. Unless clinical picture is consistent with pneumonia, given emphysema and increased risk factors for lung cancer, recommend CT of the chest for further evaluation, the urgency of which may be determined on a clinical basis.       CT foot right wo IV contrast  Result Date: 4/6/2025  FINDINGS: OSSEOUS STRUCTURES: No acute fracture. No dislocation. No osseous erosive change. No lytic or blastic osseous lesions. Remote posttraumatic changes of the anterior calcaneal process.   SOFT TISSUES: Diffuse subcutaneous soft tissue edema and skin thickening. No evidence of a focal fluid collection within the limits of CT. Diffuse subcutaneous soft tissue edema may reflect cellulitis, venous stasis, lymphedema, or fluid overload.   No osseous erosive change to suggest osteomyelitis. If there is persistent clinical concern, consider MRI.      Assessment & Plan    Acute hypoxic respiratory failure  Stable on room air  Home oxygen certification prior to discharge   Continuous pulse oximetry  Incentive spirometry/pulmonary hygiene    Possible pneumonia  Continue IV meropenem, IV  vancomycin  Sputum if able  Follow-up cultures  Infectious Disease follow-up    Lung nodule  Left lung apex with new nodular opacity measuring 3.3 cm x 1.6 cm  Follow-up CT scan chest as outpatient    Right foot ulcer  Podiatry follow-up    COPD  Continue IBD/ICS  FEV1 optimized or as outpatient    Stable for discharge from a pulmonary standpoint after home oxygen certification  Follow-up with Dr. Miguel in 2 weeks to discuss timing of CT scan chest    Tess Snell APRN-CNP  Pulmonary & Critical Care Medicine

## 2025-04-21 NOTE — CARE PLAN
The patient's goals for the shift include      The clinical goals for the shift include manage pain, monitor vitals, IV antibiotics, remain free from falls      Problem: Safety - Adult  Goal: Free from fall injury  Outcome: Progressing     Problem: Discharge Planning  Goal: Discharge to home or other facility with appropriate resources  Outcome: Progressing     Problem: Chronic Conditions and Co-morbidities  Goal: Patient's chronic conditions and co-morbidity symptoms are monitored and maintained or improved  Outcome: Progressing     Problem: Nutrition  Goal: Nutrient intake appropriate for maintaining nutritional needs  Outcome: Progressing     Problem: Pain  Goal: Takes deep breaths with improved pain control throughout the shift  Outcome: Progressing  Goal: Turns in bed with improved pain control throughout the shift  Outcome: Progressing  Goal: Walks with improved pain control throughout the shift  Outcome: Progressing  Goal: Performs ADL's with improved pain control throughout shift  Outcome: Progressing  Goal: Participates in PT with improved pain control throughout the shift  Outcome: Progressing  Goal: Free from opioid side effects throughout the shift  Outcome: Progressing  Goal: Free from acute confusion related to pain meds throughout the shift  Outcome: Progressing     Problem: Fall/Injury  Goal: Not fall by end of shift  Outcome: Progressing  Goal: Be free from injury by end of the shift  Outcome: Progressing  Goal: Verbalize understanding of personal risk factors for fall in the hospital  Outcome: Progressing  Goal: Verbalize understanding of risk factor reduction measures to prevent injury from fall in the home  Outcome: Progressing  Goal: Use assistive devices by end of the shift  Outcome: Progressing  Goal: Pace activities to prevent fatigue by end of the shift  Outcome: Progressing     Problem: Respiratory  Goal: Clear secretions with interventions this shift  Outcome: Progressing  Goal: Minimize  anxiety/maximize coping throughout shift  Outcome: Progressing  Goal: Minimal/no exertional discomfort or dyspnea this shift  Outcome: Progressing  Goal: Wean oxygen to maintain O2 saturation per order/standard this shift  Outcome: Progressing

## 2025-04-21 NOTE — CARE PLAN
The patient's goals for the shift include  safety, wean off O2    The clinical goals for the shift include manage pain, monitor vitals, IV antibiotics, remain free from falls    Problem: Safety - Adult  Goal: Free from fall injury  Outcome: Progressing     Problem: Discharge Planning  Goal: Discharge to home or other facility with appropriate resources  Outcome: Progressing     Problem: Chronic Conditions and Co-morbidities  Goal: Patient's chronic conditions and co-morbidity symptoms are monitored and maintained or improved  Outcome: Progressing     Problem: Nutrition  Goal: Nutrient intake appropriate for maintaining nutritional needs  Outcome: Progressing     Problem: Pain  Goal: Takes deep breaths with improved pain control throughout the shift  Outcome: Progressing  Goal: Turns in bed with improved pain control throughout the shift  Outcome: Progressing  Goal: Walks with improved pain control throughout the shift  Outcome: Progressing  Goal: Performs ADL's with improved pain control throughout shift  Outcome: Progressing  Goal: Participates in PT with improved pain control throughout the shift  Outcome: Progressing  Goal: Free from opioid side effects throughout the shift  Outcome: Progressing  Goal: Free from acute confusion related to pain meds throughout the shift  Outcome: Progressing     Problem: Fall/Injury  Goal: Not fall by end of shift  Outcome: Progressing  Goal: Be free from injury by end of the shift  Outcome: Progressing  Goal: Verbalize understanding of personal risk factors for fall in the hospital  Outcome: Progressing  Goal: Verbalize understanding of risk factor reduction measures to prevent injury from fall in the home  Outcome: Progressing  Goal: Use assistive devices by end of the shift  Outcome: Progressing  Goal: Pace activities to prevent fatigue by end of the shift  Outcome: Progressing     Problem: Respiratory  Goal: Clear secretions with interventions this shift  Outcome:  Progressing  Goal: Minimize anxiety/maximize coping throughout shift  Outcome: Progressing  Goal: Minimal/no exertional discomfort or dyspnea this shift  Outcome: Progressing  Goal: Wean oxygen to maintain O2 saturation per order/standard this shift  Outcome: Progressing

## 2025-04-21 NOTE — DISCHARGE INSTRUCTIONS
Right foot bandage change instructions:  -Change the bandage once per day  -Remove bandage  -Wash the foot and ulcer gently with soap and water.  -Dry the foot well.  -Apply Medihoney (manuka honey) directly onto the wound.  -Apply a large Band-Aid.    Please follow up with pulmonology, podiatry, and PCP

## 2025-04-21 NOTE — PROGRESS NOTES
04/21/25 0938   Discharge Planning   Living Arrangements Alone   Support Systems Children;Parent;Family members   Assistance Needed Patient reports she is independent of all ADLs and IADLs. She denies need for a caregiver.   Type of Residence Private residence   Do you have animals or pets at home? Yes   Type of Animals or Pets cats  (Daughter is caring for the cats during patient's admission.)   Who is requesting discharge planning? Provider   Home or Post Acute Services None   Expected Discharge Disposition Home   Does the patient need discharge transport arranged? No   Financial Resource Strain   How hard is it for you to pay for the very basics like food, housing, medical care, and heating? Not very   Housing Stability   In the last 12 months, was there a time when you were not able to pay the mortgage or rent on time? N   In the past 12 months, how many times have you moved where you were living? 0   At any time in the past 12 months, were you homeless or living in a shelter (including now)? N   Transportation Needs   In the past 12 months, has lack of transportation kept you from medical appointments or from getting medications? no   In the past 12 months, has lack of transportation kept you from meetings, work, or from getting things needed for daily living? No   Stroke Family Assessment   Stroke Family Assessment Needed No   Intensity of Service   Intensity of Service 0-30 min     MSW met with patient at bedside. She reports she lives at home alone and is independent of all ADLs and IADLs. Patient reports she has cats and that daughter is caring for them during this admission. Patient plans to return home upon discharge and denies any home going needs or concerns. Did discuss possible home care due to therapy recommendations of low intensity but patient declined. Patient denies any barriers to obtaining food, medication or transportation. Patient may, however, need transportation home. She is currently  waiting to hear back if family is able to pick her up when discharged.     Patient plans to return home upon discharge.

## 2025-04-22 LAB
ATRIAL RATE: 99 BPM
P AXIS: 59 DEGREES
P OFFSET: 207 MS
P ONSET: 151 MS
PR INTERVAL: 136 MS
Q ONSET: 219 MS
QRS COUNT: 16 BEATS
QRS DURATION: 94 MS
QT INTERVAL: 354 MS
QTC CALCULATION(BAZETT): 454 MS
QTC FREDERICIA: 418 MS
R AXIS: 35 DEGREES
T AXIS: 53 DEGREES
T OFFSET: 396 MS
VENTRICULAR RATE: 99 BPM

## 2025-04-22 NOTE — NURSING NOTE
Patient discharged home, patient arranged her own ride. [facility] RN educated patient on her discharge instructions. All personal belongings sent with patient including her cell phone.

## 2025-04-23 LAB
BACTERIA BLD CULT: NORMAL
BACTERIA BLD CULT: NORMAL

## 2025-04-25 ENCOUNTER — TELEPHONE (OUTPATIENT)
Dept: INPATIENT UNIT | Facility: HOSPITAL | Age: 60
End: 2025-04-25

## 2025-04-25 ENCOUNTER — TELEPHONE (OUTPATIENT)
Dept: INPATIENT UNIT | Facility: HOSPITAL | Age: 60
End: 2025-04-25
Payer: MEDICAID

## 2025-06-26 ENCOUNTER — APPOINTMENT (OUTPATIENT)
Dept: RADIOLOGY | Facility: HOSPITAL | Age: 60
End: 2025-06-26
Payer: MEDICAID

## 2025-06-26 ENCOUNTER — APPOINTMENT (OUTPATIENT)
Dept: CARDIOLOGY | Facility: HOSPITAL | Age: 60
End: 2025-06-26
Payer: MEDICAID

## 2025-06-26 ENCOUNTER — HOSPITAL ENCOUNTER (EMERGENCY)
Facility: HOSPITAL | Age: 60
Discharge: HOME | End: 2025-06-26
Attending: STUDENT IN AN ORGANIZED HEALTH CARE EDUCATION/TRAINING PROGRAM
Payer: MEDICAID

## 2025-06-26 VITALS
WEIGHT: 191.36 LBS | RESPIRATION RATE: 18 BRPM | OXYGEN SATURATION: 100 % | HEIGHT: 63 IN | TEMPERATURE: 97.1 F | DIASTOLIC BLOOD PRESSURE: 69 MMHG | HEART RATE: 75 BPM | SYSTOLIC BLOOD PRESSURE: 127 MMHG | BODY MASS INDEX: 33.91 KG/M2

## 2025-06-26 DIAGNOSIS — R60.9 EDEMA, UNSPECIFIED TYPE: Primary | ICD-10-CM

## 2025-06-26 LAB
ALBUMIN SERPL BCP-MCNC: 4.2 G/DL (ref 3.4–5)
ALP SERPL-CCNC: 56 U/L (ref 33–110)
ALT SERPL W P-5'-P-CCNC: 10 U/L (ref 7–45)
ANION GAP SERPL CALC-SCNC: 8 MMOL/L (ref 10–20)
APPEARANCE UR: CLEAR
AST SERPL W P-5'-P-CCNC: 15 U/L (ref 9–39)
ATRIAL RATE: 76 BPM
BASOPHILS # BLD AUTO: 0.05 X10*3/UL (ref 0–0.1)
BASOPHILS NFR BLD AUTO: 1 %
BILIRUB SERPL-MCNC: 0.4 MG/DL (ref 0–1.2)
BILIRUB UR STRIP.AUTO-MCNC: NEGATIVE MG/DL
BNP SERPL-MCNC: 54 PG/ML (ref 0–99)
BUN SERPL-MCNC: 17 MG/DL (ref 6–23)
CALCIUM SERPL-MCNC: 8.8 MG/DL (ref 8.6–10.3)
CARDIAC TROPONIN I PNL SERPL HS: 4 NG/L (ref 0–13)
CARDIAC TROPONIN I PNL SERPL HS: 4 NG/L (ref 0–13)
CHLORIDE SERPL-SCNC: 106 MMOL/L (ref 98–107)
CO2 SERPL-SCNC: 27 MMOL/L (ref 21–32)
COLOR UR: YELLOW
CREAT SERPL-MCNC: 0.82 MG/DL (ref 0.5–1.05)
EGFRCR SERPLBLD CKD-EPI 2021: 83 ML/MIN/1.73M*2
EOSINOPHIL # BLD AUTO: 0.2 X10*3/UL (ref 0–0.7)
EOSINOPHIL NFR BLD AUTO: 4.1 %
ERYTHROCYTE [DISTWIDTH] IN BLOOD BY AUTOMATED COUNT: 13.1 % (ref 11.5–14.5)
GLUCOSE SERPL-MCNC: 97 MG/DL (ref 74–99)
GLUCOSE UR STRIP.AUTO-MCNC: NEGATIVE MG/DL
HCT VFR BLD AUTO: 39.8 % (ref 36–46)
HGB BLD-MCNC: 13.3 G/DL (ref 12–16)
IMM GRANULOCYTES # BLD AUTO: 0.01 X10*3/UL (ref 0–0.7)
IMM GRANULOCYTES NFR BLD AUTO: 0.2 % (ref 0–0.9)
KETONES UR STRIP.AUTO-MCNC: NEGATIVE MG/DL
LACTATE SERPL-SCNC: 0.8 MMOL/L (ref 0.4–2)
LEUKOCYTE ESTERASE UR QL STRIP.AUTO: NEGATIVE
LYMPHOCYTES # BLD AUTO: 1.94 X10*3/UL (ref 1.2–4.8)
LYMPHOCYTES NFR BLD AUTO: 40 %
MAGNESIUM SERPL-MCNC: 1.9 MG/DL (ref 1.6–2.4)
MCH RBC QN AUTO: 32.1 PG (ref 26–34)
MCHC RBC AUTO-ENTMCNC: 33.4 G/DL (ref 32–36)
MCV RBC AUTO: 96 FL (ref 80–100)
MONOCYTES # BLD AUTO: 0.51 X10*3/UL (ref 0.1–1)
MONOCYTES NFR BLD AUTO: 10.5 %
NEUTROPHILS # BLD AUTO: 2.14 X10*3/UL (ref 1.2–7.7)
NEUTROPHILS NFR BLD AUTO: 44.2 %
NITRITE UR QL STRIP.AUTO: NEGATIVE
NRBC BLD-RTO: 0 /100 WBCS (ref 0–0)
P AXIS: 76 DEGREES
P OFFSET: 212 MS
P ONSET: 149 MS
PH UR STRIP.AUTO: 6.5 [PH]
PLATELET # BLD AUTO: 214 X10*3/UL (ref 150–450)
POTASSIUM SERPL-SCNC: 4.1 MMOL/L (ref 3.5–5.3)
PR INTERVAL: 144 MS
PROT SERPL-MCNC: 6.6 G/DL (ref 6.4–8.2)
PROT UR STRIP.AUTO-MCNC: NEGATIVE MG/DL
Q ONSET: 221 MS
QRS COUNT: 12 BEATS
QRS DURATION: 92 MS
QT INTERVAL: 396 MS
QTC CALCULATION(BAZETT): 445 MS
QTC FREDERICIA: 428 MS
R AXIS: 56 DEGREES
RBC # BLD AUTO: 4.14 X10*6/UL (ref 4–5.2)
RBC # UR STRIP.AUTO: NEGATIVE MG/DL
SODIUM SERPL-SCNC: 137 MMOL/L (ref 136–145)
SP GR UR STRIP.AUTO: 1.01
T AXIS: 51 DEGREES
T OFFSET: 419 MS
UROBILINOGEN UR STRIP.AUTO-MCNC: NORMAL MG/DL
VENTRICULAR RATE: 76 BPM
WBC # BLD AUTO: 4.9 X10*3/UL (ref 4.4–11.3)

## 2025-06-26 PROCEDURE — 83880 ASSAY OF NATRIURETIC PEPTIDE: CPT | Performed by: STUDENT IN AN ORGANIZED HEALTH CARE EDUCATION/TRAINING PROGRAM

## 2025-06-26 PROCEDURE — 36415 COLL VENOUS BLD VENIPUNCTURE: CPT | Performed by: STUDENT IN AN ORGANIZED HEALTH CARE EDUCATION/TRAINING PROGRAM

## 2025-06-26 PROCEDURE — 85025 COMPLETE CBC W/AUTO DIFF WBC: CPT | Performed by: STUDENT IN AN ORGANIZED HEALTH CARE EDUCATION/TRAINING PROGRAM

## 2025-06-26 PROCEDURE — 84484 ASSAY OF TROPONIN QUANT: CPT | Performed by: STUDENT IN AN ORGANIZED HEALTH CARE EDUCATION/TRAINING PROGRAM

## 2025-06-26 PROCEDURE — 73630 X-RAY EXAM OF FOOT: CPT | Mod: RIGHT SIDE | Performed by: RADIOLOGY

## 2025-06-26 PROCEDURE — 83735 ASSAY OF MAGNESIUM: CPT | Performed by: STUDENT IN AN ORGANIZED HEALTH CARE EDUCATION/TRAINING PROGRAM

## 2025-06-26 PROCEDURE — 81003 URINALYSIS AUTO W/O SCOPE: CPT | Performed by: STUDENT IN AN ORGANIZED HEALTH CARE EDUCATION/TRAINING PROGRAM

## 2025-06-26 PROCEDURE — 73630 X-RAY EXAM OF FOOT: CPT | Mod: RT

## 2025-06-26 PROCEDURE — 83605 ASSAY OF LACTIC ACID: CPT | Performed by: STUDENT IN AN ORGANIZED HEALTH CARE EDUCATION/TRAINING PROGRAM

## 2025-06-26 PROCEDURE — 80053 COMPREHEN METABOLIC PANEL: CPT | Performed by: STUDENT IN AN ORGANIZED HEALTH CARE EDUCATION/TRAINING PROGRAM

## 2025-06-26 PROCEDURE — 99285 EMERGENCY DEPT VISIT HI MDM: CPT | Performed by: STUDENT IN AN ORGANIZED HEALTH CARE EDUCATION/TRAINING PROGRAM

## 2025-06-26 PROCEDURE — 93005 ELECTROCARDIOGRAM TRACING: CPT

## 2025-06-26 ASSESSMENT — PAIN SCALES - GENERAL
PAINLEVEL_OUTOF10: 0 - NO PAIN
PAINLEVEL_OUTOF10: 7

## 2025-06-26 ASSESSMENT — PAIN DESCRIPTION - PROGRESSION: CLINICAL_PROGRESSION: NOT CHANGED

## 2025-06-26 ASSESSMENT — PAIN - FUNCTIONAL ASSESSMENT
PAIN_FUNCTIONAL_ASSESSMENT: 0-10
PAIN_FUNCTIONAL_ASSESSMENT: 0-10

## 2025-06-26 ASSESSMENT — PAIN DESCRIPTION - DESCRIPTORS: DESCRIPTORS: NUMBNESS;TINGLING

## 2025-06-26 ASSESSMENT — PAIN DESCRIPTION - LOCATION: LOCATION: LEG

## 2025-06-26 ASSESSMENT — PAIN DESCRIPTION - FREQUENCY: FREQUENCY: CONSTANT/CONTINUOUS

## 2025-06-27 LAB
HOLD SPECIMEN: NORMAL

## 2025-07-09 NOTE — ED PROVIDER NOTES
"Chief Complaint: Lower extremity swelling  HPI: This a 59-year-old female, brought to the emergency department by EMS for evaluation of lower extremity swelling for the last several weeks, which has not resolved at home.  Patient is concerned that she may have \"water sacks\" around her ankles and a friend told her that she may need them drained.  She states that a couple days ago she did scratch her leg on an object and it oozed out water, and she would like the rest of the water removed from her legs.  She denies any fevers, chills, chest pain, shortness of breath.  She is supposed to be on a water pill, however has not been taking it regularly.    Medical History[1]   Surgical History[2]    Physical Exam  Vitals and nursing note reviewed.   Constitutional:       Appearance: Normal appearance.   HENT:      Head: Normocephalic and atraumatic.      Mouth/Throat:      Mouth: Mucous membranes are moist.   Eyes:      Conjunctiva/sclera: Conjunctivae normal.   Cardiovascular:      Rate and Rhythm: Normal rate.      Pulses: Normal pulses.   Pulmonary:      Effort: Pulmonary effort is normal.   Abdominal:      General: Abdomen is flat.      Palpations: Abdomen is soft.   Musculoskeletal:         General: No swelling or tenderness. Normal range of motion.      Cervical back: Normal range of motion.      Right lower leg: Edema present.      Left lower leg: Edema present.   Skin:     General: Skin is warm and dry.      Findings: No erythema.      Comments: Ulcer-like wound to the bottom of the right heel, without any surrounding erythema or purulence   Neurological:      General: No focal deficit present.      Mental Status: She is alert.   Psychiatric:         Mood and Affect: Mood normal.            ED Course/MDM  Diagnoses as of 07/09/25 0834   Edema, unspecified type       This is a 59 y.o. female presenting to the ED for evaluation of bilateral lower extremity swelling for the last several weeks.  Patient was told by a " "friend that she may be able to have the legs drained in the emergency department, and so she called EMS.  On physical exam, the patient is resting comfortably in the bed, no acute distress.  She does have pitting edema, worse around the ankle bones bilaterally without any overlying erythema, ecchymosis, or other skin changes.  Pulses are intact.  She does have a chronic wound to the right heel that is well-appearing, however she states is still painful.  X-ray imaging of the right foot was obtained, without any gross signs of osteomyelitis, or other acute bony abnormalities.  Lab work was overall grossly unremarkable.  I do not feel that any other imaging is indicated at this time.  As we are unable to \"drain\" her legs, patient was advised to take her Lasix as prescribed, as well as use compression and elevation to help with the swelling in her lower extremities.  She was advised to follow-up with her primary care provider, and given return precautions.  She was discharged home in stable condition    Final Impression  1.  Bilateral lower extremity swelling  Disposition/Plan: Discharge home  Condition at disposition: Stable.     Katy Covarrubias DO  Emergency Medicine Physician       [1]   Past Medical History:  Diagnosis Date    Anxiety and depression     Asthma     COPD (chronic obstructive pulmonary disease) (Multi)     Emphysema lung (Multi)     Emphysema lung (Multi)     IBS (irritable bowel syndrome)     Opioid dependence in remission (Multi)     Tobacco abuse    [2]   Past Surgical History:  Procedure Laterality Date    ANKLE SURGERY Left     COLONOSCOPY  10/26/2017    Complete Colonoscopy    HAND SURGERY Right     OTHER SURGICAL HISTORY      Vocal cord repair s/p throat cut    SKULL FRACTURE ELEVATION N/A         Katy Covarrubias DO  07/09/25 0839    "

## 2025-07-19 LAB
ATRIAL RATE: 76 BPM
P AXIS: 76 DEGREES
P OFFSET: 212 MS
P ONSET: 149 MS
PR INTERVAL: 144 MS
Q ONSET: 221 MS
QRS COUNT: 12 BEATS
QRS DURATION: 92 MS
QT INTERVAL: 396 MS
QTC CALCULATION(BAZETT): 445 MS
QTC FREDERICIA: 428 MS
R AXIS: 56 DEGREES
T AXIS: 51 DEGREES
T OFFSET: 419 MS
VENTRICULAR RATE: 76 BPM